# Patient Record
Sex: FEMALE | Race: BLACK OR AFRICAN AMERICAN | Employment: UNEMPLOYED | URBAN - METROPOLITAN AREA
[De-identification: names, ages, dates, MRNs, and addresses within clinical notes are randomized per-mention and may not be internally consistent; named-entity substitution may affect disease eponyms.]

---

## 2017-04-13 LAB — LDL-C, EXTERNAL: 104

## 2018-05-21 ENCOUNTER — HOSPITAL ENCOUNTER (OUTPATIENT)
Dept: MRI IMAGING | Age: 48
Discharge: HOME | End: 2018-05-21
Attending: ORTHOPAEDIC SURGERY
Payer: COMMERCIAL

## 2018-05-21 DIAGNOSIS — M54.42 ACUTE BACK PAIN WITH SCIATICA, LEFT: ICD-10-CM

## 2018-05-21 DIAGNOSIS — M54.41 ACUTE BACK PAIN WITH SCIATICA, RIGHT: ICD-10-CM

## 2018-05-21 PROCEDURE — 72148 MRI LUMBAR SPINE W/O DYE: CPT

## 2018-08-15 ENCOUNTER — HOSPITAL ENCOUNTER (OUTPATIENT)
Dept: CT IMAGING | Age: 48
Discharge: HOME | End: 2018-08-15
Attending: ORTHOPAEDIC SURGERY
Payer: COMMERCIAL

## 2018-08-15 ENCOUNTER — HOSPITAL ENCOUNTER (OUTPATIENT)
Dept: PREADMISSION TESTING | Age: 48
Discharge: HOME | End: 2018-08-15
Attending: ORTHOPAEDIC SURGERY
Payer: COMMERCIAL

## 2018-08-15 VITALS
HEART RATE: 73 BPM | WEIGHT: 293 LBS | SYSTOLIC BLOOD PRESSURE: 110 MMHG | DIASTOLIC BLOOD PRESSURE: 76 MMHG | HEIGHT: 64 IN | TEMPERATURE: 97.9 F | BODY MASS INDEX: 50.02 KG/M2

## 2018-08-15 DIAGNOSIS — M51.26 LUMBAR DISC HERNIATION: ICD-10-CM

## 2018-08-15 DIAGNOSIS — M48.061 LUMBAR STENOSIS: ICD-10-CM

## 2018-08-15 LAB
ABO + RH BLD: NORMAL
ANION GAP SERPL CALC-SCNC: 10 MMOL/L (ref 5–15)
APPEARANCE UR: CLEAR
ATRIAL RATE: 69 BPM
BACTERIA URNS QL MICRO: NEGATIVE /HPF
BILIRUB UR QL: NEGATIVE
BLOOD GROUP ANTIBODIES SERPL: NORMAL
BUN SERPL-MCNC: 18 MG/DL (ref 6–20)
BUN/CREAT SERPL: 23 (ref 12–20)
CALCIUM SERPL-MCNC: 9.4 MG/DL (ref 8.5–10.1)
CALCULATED P AXIS, ECG09: 63 DEGREES
CALCULATED R AXIS, ECG10: 18 DEGREES
CALCULATED T AXIS, ECG11: 23 DEGREES
CHLORIDE SERPL-SCNC: 106 MMOL/L (ref 97–108)
CO2 SERPL-SCNC: 22 MMOL/L (ref 21–32)
COLOR UR: NORMAL
CREAT SERPL-MCNC: 0.77 MG/DL (ref 0.55–1.02)
DIAGNOSIS, 93000: NORMAL
EPITH CASTS URNS QL MICRO: NORMAL /LPF
ERYTHROCYTE [DISTWIDTH] IN BLOOD BY AUTOMATED COUNT: 13.7 % (ref 11.5–14.5)
EST. AVERAGE GLUCOSE BLD GHB EST-MCNC: 108 MG/DL
GLUCOSE SERPL-MCNC: 98 MG/DL (ref 65–100)
GLUCOSE UR STRIP.AUTO-MCNC: NEGATIVE MG/DL
HBA1C MFR BLD: 5.4 % (ref 4.2–6.3)
HCT VFR BLD AUTO: 39.3 % (ref 35–47)
HGB BLD-MCNC: 12.4 G/DL (ref 11.5–16)
HGB UR QL STRIP: NEGATIVE
HYALINE CASTS URNS QL MICRO: NORMAL /LPF (ref 0–5)
INR PPP: 1 (ref 0.9–1.1)
KETONES UR QL STRIP.AUTO: NEGATIVE MG/DL
LEUKOCYTE ESTERASE UR QL STRIP.AUTO: NEGATIVE
MCH RBC QN AUTO: 30.3 PG (ref 26–34)
MCHC RBC AUTO-ENTMCNC: 31.6 G/DL (ref 30–36.5)
MCV RBC AUTO: 96.1 FL (ref 80–99)
NITRITE UR QL STRIP.AUTO: NEGATIVE
NRBC # BLD: 0 K/UL (ref 0–0.01)
NRBC BLD-RTO: 0 PER 100 WBC
P-R INTERVAL, ECG05: 128 MS
PH UR STRIP: 5 [PH] (ref 5–8)
PLATELET # BLD AUTO: 172 K/UL (ref 150–400)
PMV BLD AUTO: 11.5 FL (ref 8.9–12.9)
POTASSIUM SERPL-SCNC: 4.3 MMOL/L (ref 3.5–5.1)
PROT UR STRIP-MCNC: NEGATIVE MG/DL
PROTHROMBIN TIME: 10 SEC (ref 9–11.1)
Q-T INTERVAL, ECG07: 430 MS
QRS DURATION, ECG06: 88 MS
QTC CALCULATION (BEZET), ECG08: 460 MS
RBC # BLD AUTO: 4.09 M/UL (ref 3.8–5.2)
RBC #/AREA URNS HPF: NORMAL /HPF (ref 0–5)
SODIUM SERPL-SCNC: 138 MMOL/L (ref 136–145)
SP GR UR REFRACTOMETRY: 1.02 (ref 1–1.03)
SPECIMEN EXP DATE BLD: NORMAL
UA: UC IF INDICATED,UAUC: NORMAL
UROBILINOGEN UR QL STRIP.AUTO: 0.2 EU/DL (ref 0.2–1)
VENTRICULAR RATE, ECG03: 69 BPM
WBC # BLD AUTO: 6.5 K/UL (ref 3.6–11)
WBC URNS QL MICRO: NORMAL /HPF (ref 0–4)

## 2018-08-15 PROCEDURE — 72131 CT LUMBAR SPINE W/O DYE: CPT

## 2018-08-15 PROCEDURE — 83036 HEMOGLOBIN GLYCOSYLATED A1C: CPT | Performed by: ORTHOPAEDIC SURGERY

## 2018-08-15 PROCEDURE — 80048 BASIC METABOLIC PNL TOTAL CA: CPT | Performed by: ORTHOPAEDIC SURGERY

## 2018-08-15 PROCEDURE — 81001 URINALYSIS AUTO W/SCOPE: CPT | Performed by: ORTHOPAEDIC SURGERY

## 2018-08-15 PROCEDURE — 85610 PROTHROMBIN TIME: CPT | Performed by: ORTHOPAEDIC SURGERY

## 2018-08-15 PROCEDURE — 85027 COMPLETE CBC AUTOMATED: CPT | Performed by: ORTHOPAEDIC SURGERY

## 2018-08-15 PROCEDURE — 86900 BLOOD TYPING SEROLOGIC ABO: CPT | Performed by: ORTHOPAEDIC SURGERY

## 2018-08-15 PROCEDURE — 93005 ELECTROCARDIOGRAM TRACING: CPT

## 2018-08-15 RX ORDER — GUAIFENESIN 100 MG/5ML
81 LIQUID (ML) ORAL DAILY
COMMUNITY

## 2018-08-15 RX ORDER — MELOXICAM 15 MG/1
15 TABLET ORAL DAILY
COMMUNITY
End: 2018-09-12

## 2018-08-15 RX ORDER — ASCORBIC ACID 500 MG
TABLET ORAL DAILY
COMMUNITY

## 2018-08-15 RX ORDER — CYANOCOBALAMIN (VITAMIN B-12) 500 MCG
800 TABLET ORAL DAILY
COMMUNITY

## 2018-08-15 RX ORDER — TELMISARTAN AND HYDROCHLORTHIAZIDE 80; 12.5 MG/1; MG/1
1 TABLET ORAL DAILY
COMMUNITY
End: 2021-01-15

## 2018-08-15 RX ORDER — GABAPENTIN 300 MG/1
600 CAPSULE ORAL 2 TIMES DAILY
COMMUNITY

## 2018-08-15 NOTE — PERIOP NOTES
PREOPERATIVE INSTRUCTIONS REVIEWED WITH PATIENT. PATIENT GIVEN TWO--SIX PACKS OF CHG WIPES. INSTRUCTIONS REVIEWED ON USE OF CHG WIPES. PATIENT GIVEN SSI INFECTIONS SHEET; MRSA/MSSA TREATMENT INSTRUCTION SHEET GIVEN WITH AN EXPLANATION TO PATIENT THAT THEY WILL BE NOTIFIED IF TREATMENT INSTRUCTIONS NEED TO BE INITIATED. PATIENT WAS GIVEN THE OPPORTUNITY TO ASK QUESTIONS; REGARDING THE INFORMATION PROVIDED. PREOP DIET AND NUTRITION UPDATED GUIDELINES/ INSTRUCTIONS REVIEWED WITH PATIENT. PATIENT GIVEN INSTRUCTIONS REGARDING INCENTIVE SPIROMETRY USE IN PREOP JOINT CLASS. PATIENT ATTENDED PREOP JOINT CLASS TODAY.    DARIO/CATARINA NOTIFIED OF PATIENT WEIGHT:  324 LBS.

## 2018-08-16 LAB
BACTERIA SPEC CULT: NORMAL
BACTERIA SPEC CULT: NORMAL
SERVICE CMNT-IMP: NORMAL

## 2018-08-28 NOTE — PERIOP NOTES
RETURNED PATIENT PHONE CALL; SHE HAD CONCERNS RE: POST DISCHARGE CARE SINCE HER DTR. WILL BE WORKING DURING THE DAYTIME; INSTRUCTED HER THAT PHYSICAL THERAPY WILL EVALUATE HER NEEDS/ABILITIES POSTOP AND MAKE ARRANGEMENTS FOR HOME PT AND NURSING CARE AS NEEDED; ADVISED HER TO CONTACT SWETA SHAHID RN, SPINE SURGERY FOR ANY FURTHER INFORMATION.

## 2018-09-05 ENCOUNTER — ANESTHESIA EVENT (OUTPATIENT)
Dept: SURGERY | Age: 48
DRG: 460 | End: 2018-09-05
Payer: COMMERCIAL

## 2018-09-06 ENCOUNTER — APPOINTMENT (OUTPATIENT)
Dept: GENERAL RADIOLOGY | Age: 48
DRG: 460 | End: 2018-09-06
Attending: ORTHOPAEDIC SURGERY
Payer: COMMERCIAL

## 2018-09-06 ENCOUNTER — ANESTHESIA (OUTPATIENT)
Dept: SURGERY | Age: 48
DRG: 460 | End: 2018-09-06
Payer: COMMERCIAL

## 2018-09-06 ENCOUNTER — HOSPITAL ENCOUNTER (INPATIENT)
Age: 48
LOS: 6 days | Discharge: HOME HEALTH | DRG: 460 | End: 2018-09-12
Attending: ORTHOPAEDIC SURGERY | Admitting: ORTHOPAEDIC SURGERY
Payer: COMMERCIAL

## 2018-09-06 DIAGNOSIS — M48.061 SPINAL STENOSIS OF LUMBAR REGION, UNSPECIFIED WHETHER NEUROGENIC CLAUDICATION PRESENT: Primary | ICD-10-CM

## 2018-09-06 LAB
ABO + RH BLD: NORMAL
BLOOD GROUP ANTIBODIES SERPL: NORMAL
GLUCOSE BLD STRIP.AUTO-MCNC: 110 MG/DL (ref 65–100)
SERVICE CMNT-IMP: ABNORMAL
SPECIMEN EXP DATE BLD: NORMAL

## 2018-09-06 PROCEDURE — 77030013079 HC BLNKT BAIR HGGR 3M -A: Performed by: ANESTHESIOLOGY

## 2018-09-06 PROCEDURE — 77030032490 HC SLV COMPR SCD KNE COVD -B: Performed by: ORTHOPAEDIC SURGERY

## 2018-09-06 PROCEDURE — 77030029099 HC BN WAX SSPC -A: Performed by: ORTHOPAEDIC SURGERY

## 2018-09-06 PROCEDURE — 77030039456 HC KT SPINE DISP MAZR -G1: Performed by: ORTHOPAEDIC SURGERY

## 2018-09-06 PROCEDURE — 77030003029 HC SUT VCRL J&J -B: Performed by: ORTHOPAEDIC SURGERY

## 2018-09-06 PROCEDURE — 77030031139 HC SUT VCRL2 J&J -A: Performed by: ORTHOPAEDIC SURGERY

## 2018-09-06 PROCEDURE — 76210000017 HC OR PH I REC 1.5 TO 2 HR: Performed by: ORTHOPAEDIC SURGERY

## 2018-09-06 PROCEDURE — 77030011264 HC ELECTRD BLD EXT COVD -A: Performed by: ORTHOPAEDIC SURGERY

## 2018-09-06 PROCEDURE — 36415 COLL VENOUS BLD VENIPUNCTURE: CPT | Performed by: ORTHOPAEDIC SURGERY

## 2018-09-06 PROCEDURE — 77030034169 HC GRFT BN BIOACTV INTRFC 1G BSTEB -F: Performed by: ORTHOPAEDIC SURGERY

## 2018-09-06 PROCEDURE — 77030026438 HC STYL ET INTUB CARD -A: Performed by: ANESTHESIOLOGY

## 2018-09-06 PROCEDURE — 8E0W0CZ ROBOTIC ASSISTED PROCEDURE OF TRUNK REGION, OPEN APPROACH: ICD-10-PCS | Performed by: ORTHOPAEDIC SURGERY

## 2018-09-06 PROCEDURE — 77030004391 HC BUR FLUT MEDT -C: Performed by: ORTHOPAEDIC SURGERY

## 2018-09-06 PROCEDURE — 07DR3ZZ EXTRACTION OF ILIAC BONE MARROW, PERCUTANEOUS APPROACH: ICD-10-PCS | Performed by: ORTHOPAEDIC SURGERY

## 2018-09-06 PROCEDURE — 74011250637 HC RX REV CODE- 250/637: Performed by: ORTHOPAEDIC SURGERY

## 2018-09-06 PROCEDURE — 77030019908 HC STETH ESOPH SIMS -A: Performed by: ANESTHESIOLOGY

## 2018-09-06 PROCEDURE — 77030020782 HC GWN BAIR PAWS FLX 3M -B

## 2018-09-06 PROCEDURE — 77030008684 HC TU ET CUF COVD -B: Performed by: ANESTHESIOLOGY

## 2018-09-06 PROCEDURE — 77030012890

## 2018-09-06 PROCEDURE — C1713 ANCHOR/SCREW BN/BN,TIS/BN: HCPCS | Performed by: ORTHOPAEDIC SURGERY

## 2018-09-06 PROCEDURE — 74011000250 HC RX REV CODE- 250

## 2018-09-06 PROCEDURE — 77030039266 HC ADH SKN EXOFIN S2SG -A: Performed by: ORTHOPAEDIC SURGERY

## 2018-09-06 PROCEDURE — 88304 TISSUE EXAM BY PATHOLOGIST: CPT | Performed by: ORTHOPAEDIC SURGERY

## 2018-09-06 PROCEDURE — 0SG3071 FUSION OF LUMBOSACRAL JOINT WITH AUTOLOGOUS TISSUE SUBSTITUTE, POSTERIOR APPROACH, POSTERIOR COLUMN, OPEN APPROACH: ICD-10-PCS | Performed by: ORTHOPAEDIC SURGERY

## 2018-09-06 PROCEDURE — 74011250636 HC RX REV CODE- 250/636

## 2018-09-06 PROCEDURE — 77030014647 HC SEAL FBRN TISSL BAXT -D: Performed by: ORTHOPAEDIC SURGERY

## 2018-09-06 PROCEDURE — 77030035129: Performed by: ORTHOPAEDIC SURGERY

## 2018-09-06 PROCEDURE — 72100 X-RAY EXAM L-S SPINE 2/3 VWS: CPT

## 2018-09-06 PROCEDURE — 74011000250 HC RX REV CODE- 250: Performed by: ORTHOPAEDIC SURGERY

## 2018-09-06 PROCEDURE — 74011000272 HC RX REV CODE- 272: Performed by: ORTHOPAEDIC SURGERY

## 2018-09-06 PROCEDURE — 74011000258 HC RX REV CODE- 258

## 2018-09-06 PROCEDURE — 77030038020 HC MANFLD NEPTUNE STRY -B: Performed by: ORTHOPAEDIC SURGERY

## 2018-09-06 PROCEDURE — 77030020268 HC MISC GENERAL SUPPLY: Performed by: ORTHOPAEDIC SURGERY

## 2018-09-06 PROCEDURE — 74011250636 HC RX REV CODE- 250/636: Performed by: ORTHOPAEDIC SURGERY

## 2018-09-06 PROCEDURE — 77030034475 HC MISC IMPL SPN: Performed by: ORTHOPAEDIC SURGERY

## 2018-09-06 PROCEDURE — 77030018836 HC SOL IRR NACL ICUM -A: Performed by: ORTHOPAEDIC SURGERY

## 2018-09-06 PROCEDURE — 77030012406 HC DRN WND PENRS BARD -A: Performed by: ORTHOPAEDIC SURGERY

## 2018-09-06 PROCEDURE — 77030003666 HC NDL SPINAL BD -A: Performed by: ORTHOPAEDIC SURGERY

## 2018-09-06 PROCEDURE — 77030005515 HC CATH URETH FOL14 BARD -B: Performed by: ORTHOPAEDIC SURGERY

## 2018-09-06 PROCEDURE — 74011250636 HC RX REV CODE- 250/636: Performed by: ANESTHESIOLOGY

## 2018-09-06 PROCEDURE — 82962 GLUCOSE BLOOD TEST: CPT

## 2018-09-06 PROCEDURE — 76060000038 HC ANESTHESIA 3.5 TO 4 HR: Performed by: ORTHOPAEDIC SURGERY

## 2018-09-06 PROCEDURE — 65270000029 HC RM PRIVATE

## 2018-09-06 PROCEDURE — 76010000174 HC OR TIME 3.5 TO 4 HR INTENSV-TIER 1: Performed by: ORTHOPAEDIC SURGERY

## 2018-09-06 PROCEDURE — 86900 BLOOD TYPING SEROLOGIC ABO: CPT | Performed by: ORTHOPAEDIC SURGERY

## 2018-09-06 PROCEDURE — 76001 XR FLUOROSCOPY OVER 60 MINUTES: CPT

## 2018-09-06 DEVICE — SET SCREW, T30
Type: IMPLANTABLE DEVICE | Site: SPINE LUMBAR | Status: FUNCTIONAL
Brand: TAURUS

## 2018-09-06 DEVICE — SCREW, PEDICLE DL CANNULATED, Ø6.5X45MM
Type: IMPLANTABLE DEVICE | Site: SPINE LUMBAR | Status: FUNCTIONAL
Brand: TAURUS

## 2018-09-06 DEVICE — SCREW, PEDICLE DL CANNULATED, Ø7.5X45MM
Type: IMPLANTABLE DEVICE | Site: SPINE LUMBAR | Status: FUNCTIONAL
Brand: TAURUS

## 2018-09-06 DEVICE — GRAFT BNE SUB 7.5GM PTTY SYN SIGNAFUSE: Type: IMPLANTABLE DEVICE | Site: SPINE LUMBAR | Status: FUNCTIONAL

## 2018-09-06 DEVICE — 5.5MM CURVED ROD 45MM TI ALLOY
Type: IMPLANTABLE DEVICE | Site: SPINE LUMBAR | Status: FUNCTIONAL
Brand: TAURUS

## 2018-09-06 DEVICE — HEADBODY, ASSEMBLY, STANDARD
Type: IMPLANTABLE DEVICE | Site: SPINE LUMBAR | Status: FUNCTIONAL
Brand: TAURUS

## 2018-09-06 DEVICE — INTERFACE IS A SYNTHETIC BIOACTIVE BONE GRAFT FOR USE IN THE REPAIR OF OSSEOUS DEFECTS. IT IS SUPPLIED AS IRREGULAR SYNTHETIC GRANULES OF BIOACTIVE GLASS (45S5 BIOGLASS), SIZED FROM 200 MICRONS TO 420 MICRONS. WHEN IMPLANTED IN LIVING TISSUE, THE MATERIAL UNDERGOES A TIME DEPENDENT SURFACE MODIFICATION. THE SURFACE REACTION RESULTS IN THE FORMATION OF A CALCIUM PHOSPHATE LAYER, WHICH IS EQUIVALENT IN COMPOSITION AND STRUCTURE TO THE HYDROXYAPATITE FOUND IN BONE MINERAL. THE BIOLOGICAL APATITE LAYER OF THE GRANULES PROVIDES AN OSTEOCONDUCTIVE SCAFFOLD FOR THE GENERATION OF NEW OSSEOUS TISSUE. NEW BONE INFILTRATES AROUND THE GRANULES ALLOWING THE REPAIR OF THE DEFECT AS THE GRANULES ARE ABSORBED.
Type: IMPLANTABLE DEVICE | Site: SPINE LUMBAR | Status: FUNCTIONAL
Brand: INTERFACE

## 2018-09-06 RX ORDER — MORPHINE SULFATE 10 MG/ML
2 INJECTION, SOLUTION INTRAMUSCULAR; INTRAVENOUS
Status: DISCONTINUED | OUTPATIENT
Start: 2018-09-06 | End: 2018-09-06 | Stop reason: HOSPADM

## 2018-09-06 RX ORDER — BUPIVACAINE HYDROCHLORIDE AND EPINEPHRINE 5; 5 MG/ML; UG/ML
INJECTION, SOLUTION EPIDURAL; INTRACAUDAL; PERINEURAL AS NEEDED
Status: DISCONTINUED | OUTPATIENT
Start: 2018-09-06 | End: 2018-09-06 | Stop reason: HOSPADM

## 2018-09-06 RX ORDER — ONDANSETRON 2 MG/ML
INJECTION INTRAMUSCULAR; INTRAVENOUS AS NEEDED
Status: DISCONTINUED | OUTPATIENT
Start: 2018-09-06 | End: 2018-09-06 | Stop reason: HOSPADM

## 2018-09-06 RX ORDER — DIPHENHYDRAMINE HCL 12.5MG/5ML
12.5 ELIXIR ORAL
Status: DISPENSED | OUTPATIENT
Start: 2018-09-06 | End: 2018-09-07

## 2018-09-06 RX ORDER — KETAMINE HYDROCHLORIDE 10 MG/ML
INJECTION, SOLUTION INTRAMUSCULAR; INTRAVENOUS AS NEEDED
Status: DISCONTINUED | OUTPATIENT
Start: 2018-09-06 | End: 2018-09-06 | Stop reason: HOSPADM

## 2018-09-06 RX ORDER — ONDANSETRON 2 MG/ML
4 INJECTION INTRAMUSCULAR; INTRAVENOUS
Status: DISPENSED | OUTPATIENT
Start: 2018-09-06 | End: 2018-09-07

## 2018-09-06 RX ORDER — SODIUM CHLORIDE 0.9 % (FLUSH) 0.9 %
5-10 SYRINGE (ML) INJECTION EVERY 8 HOURS
Status: DISCONTINUED | OUTPATIENT
Start: 2018-09-06 | End: 2018-09-06 | Stop reason: HOSPADM

## 2018-09-06 RX ORDER — FENTANYL CITRATE 50 UG/ML
25 INJECTION, SOLUTION INTRAMUSCULAR; INTRAVENOUS
Status: COMPLETED | OUTPATIENT
Start: 2018-09-06 | End: 2018-09-06

## 2018-09-06 RX ORDER — ACETAMINOPHEN 325 MG/1
650 TABLET ORAL EVERY 6 HOURS
Status: DISCONTINUED | OUTPATIENT
Start: 2018-09-06 | End: 2018-09-12 | Stop reason: HOSPADM

## 2018-09-06 RX ORDER — FENTANYL CITRATE 50 UG/ML
50 INJECTION, SOLUTION INTRAMUSCULAR; INTRAVENOUS AS NEEDED
Status: DISCONTINUED | OUTPATIENT
Start: 2018-09-06 | End: 2018-09-06 | Stop reason: HOSPADM

## 2018-09-06 RX ORDER — SODIUM CHLORIDE 9 MG/ML
25 INJECTION, SOLUTION INTRAVENOUS CONTINUOUS
Status: DISCONTINUED | OUTPATIENT
Start: 2018-09-06 | End: 2018-09-06 | Stop reason: HOSPADM

## 2018-09-06 RX ORDER — FENTANYL CITRATE 50 UG/ML
INJECTION, SOLUTION INTRAMUSCULAR; INTRAVENOUS AS NEEDED
Status: DISCONTINUED | OUTPATIENT
Start: 2018-09-06 | End: 2018-09-06 | Stop reason: HOSPADM

## 2018-09-06 RX ORDER — HYDROMORPHONE HCL/0.9% NACL/PF 0.5 MG/ML
PLASTIC BAG, INJECTION (ML) INTRAVENOUS CONTINUOUS
Status: DISCONTINUED | OUTPATIENT
Start: 2018-09-06 | End: 2018-09-07

## 2018-09-06 RX ORDER — NALOXONE HYDROCHLORIDE 0.4 MG/ML
0.4 INJECTION, SOLUTION INTRAMUSCULAR; INTRAVENOUS; SUBCUTANEOUS AS NEEDED
Status: DISCONTINUED | OUTPATIENT
Start: 2018-09-06 | End: 2018-09-12 | Stop reason: HOSPADM

## 2018-09-06 RX ORDER — MIDAZOLAM HYDROCHLORIDE 1 MG/ML
1 INJECTION, SOLUTION INTRAMUSCULAR; INTRAVENOUS AS NEEDED
Status: DISCONTINUED | OUTPATIENT
Start: 2018-09-06 | End: 2018-09-06 | Stop reason: HOSPADM

## 2018-09-06 RX ORDER — ONDANSETRON 2 MG/ML
4 INJECTION INTRAMUSCULAR; INTRAVENOUS AS NEEDED
Status: DISCONTINUED | OUTPATIENT
Start: 2018-09-06 | End: 2018-09-06 | Stop reason: HOSPADM

## 2018-09-06 RX ORDER — MIDAZOLAM HYDROCHLORIDE 1 MG/ML
INJECTION, SOLUTION INTRAMUSCULAR; INTRAVENOUS AS NEEDED
Status: DISCONTINUED | OUTPATIENT
Start: 2018-09-06 | End: 2018-09-06 | Stop reason: HOSPADM

## 2018-09-06 RX ORDER — ASCORBIC ACID 500 MG
500 TABLET ORAL DAILY
Status: DISCONTINUED | OUTPATIENT
Start: 2018-09-07 | End: 2018-09-12 | Stop reason: HOSPADM

## 2018-09-06 RX ORDER — CYCLOBENZAPRINE HCL 10 MG
10 TABLET ORAL
Status: DISCONTINUED | OUTPATIENT
Start: 2018-09-06 | End: 2018-09-12 | Stop reason: HOSPADM

## 2018-09-06 RX ORDER — SODIUM CHLORIDE 0.9 % (FLUSH) 0.9 %
5-10 SYRINGE (ML) INJECTION AS NEEDED
Status: DISCONTINUED | OUTPATIENT
Start: 2018-09-06 | End: 2018-09-12 | Stop reason: HOSPADM

## 2018-09-06 RX ORDER — DEXAMETHASONE SODIUM PHOSPHATE 4 MG/ML
INJECTION, SOLUTION INTRA-ARTICULAR; INTRALESIONAL; INTRAMUSCULAR; INTRAVENOUS; SOFT TISSUE AS NEEDED
Status: DISCONTINUED | OUTPATIENT
Start: 2018-09-06 | End: 2018-09-06 | Stop reason: HOSPADM

## 2018-09-06 RX ORDER — LIDOCAINE HYDROCHLORIDE 10 MG/ML
0.1 INJECTION, SOLUTION EPIDURAL; INFILTRATION; INTRACAUDAL; PERINEURAL AS NEEDED
Status: DISCONTINUED | OUTPATIENT
Start: 2018-09-06 | End: 2018-09-06 | Stop reason: HOSPADM

## 2018-09-06 RX ORDER — FACIAL-BODY WIPES
10 EACH TOPICAL DAILY PRN
Status: DISCONTINUED | OUTPATIENT
Start: 2018-09-08 | End: 2018-09-12 | Stop reason: HOSPADM

## 2018-09-06 RX ORDER — SODIUM CHLORIDE 0.9 % (FLUSH) 0.9 %
5-10 SYRINGE (ML) INJECTION AS NEEDED
Status: DISCONTINUED | OUTPATIENT
Start: 2018-09-06 | End: 2018-09-06 | Stop reason: HOSPADM

## 2018-09-06 RX ORDER — LOSARTAN POTASSIUM 50 MG/1
100 TABLET ORAL DAILY
Status: DISCONTINUED | OUTPATIENT
Start: 2018-09-07 | End: 2018-09-09

## 2018-09-06 RX ORDER — GLYCOPYRROLATE 0.2 MG/ML
INJECTION INTRAMUSCULAR; INTRAVENOUS AS NEEDED
Status: DISCONTINUED | OUTPATIENT
Start: 2018-09-06 | End: 2018-09-06 | Stop reason: HOSPADM

## 2018-09-06 RX ORDER — POLYETHYLENE GLYCOL 3350 17 G/17G
17 POWDER, FOR SOLUTION ORAL DAILY
Status: DISCONTINUED | OUTPATIENT
Start: 2018-09-07 | End: 2018-09-07

## 2018-09-06 RX ORDER — SODIUM CHLORIDE 9 MG/ML
125 INJECTION, SOLUTION INTRAVENOUS CONTINUOUS
Status: DISPENSED | OUTPATIENT
Start: 2018-09-06 | End: 2018-09-07

## 2018-09-06 RX ORDER — DEXMEDETOMIDINE HYDROCHLORIDE 4 UG/ML
INJECTION, SOLUTION INTRAVENOUS AS NEEDED
Status: DISCONTINUED | OUTPATIENT
Start: 2018-09-06 | End: 2018-09-06 | Stop reason: HOSPADM

## 2018-09-06 RX ORDER — SODIUM CHLORIDE, SODIUM LACTATE, POTASSIUM CHLORIDE, CALCIUM CHLORIDE 600; 310; 30; 20 MG/100ML; MG/100ML; MG/100ML; MG/100ML
INJECTION, SOLUTION INTRAVENOUS
Status: DISCONTINUED | OUTPATIENT
Start: 2018-09-06 | End: 2018-09-06 | Stop reason: HOSPADM

## 2018-09-06 RX ORDER — SODIUM CHLORIDE, SODIUM LACTATE, POTASSIUM CHLORIDE, CALCIUM CHLORIDE 600; 310; 30; 20 MG/100ML; MG/100ML; MG/100ML; MG/100ML
125 INJECTION, SOLUTION INTRAVENOUS CONTINUOUS
Status: DISCONTINUED | OUTPATIENT
Start: 2018-09-06 | End: 2018-09-06 | Stop reason: HOSPADM

## 2018-09-06 RX ORDER — HYDROCHLOROTHIAZIDE 25 MG/1
12.5 TABLET ORAL DAILY
Status: DISCONTINUED | OUTPATIENT
Start: 2018-09-07 | End: 2018-09-09

## 2018-09-06 RX ORDER — OXYCODONE HYDROCHLORIDE 5 MG/1
10 TABLET ORAL
Status: DISCONTINUED | OUTPATIENT
Start: 2018-09-06 | End: 2018-09-12 | Stop reason: HOSPADM

## 2018-09-06 RX ORDER — SODIUM CHLORIDE 0.9 % (FLUSH) 0.9 %
5-10 SYRINGE (ML) INJECTION EVERY 8 HOURS
Status: DISCONTINUED | OUTPATIENT
Start: 2018-09-07 | End: 2018-09-12 | Stop reason: HOSPADM

## 2018-09-06 RX ORDER — DIPHENHYDRAMINE HYDROCHLORIDE 50 MG/ML
12.5 INJECTION, SOLUTION INTRAMUSCULAR; INTRAVENOUS AS NEEDED
Status: DISCONTINUED | OUTPATIENT
Start: 2018-09-06 | End: 2018-09-06 | Stop reason: HOSPADM

## 2018-09-06 RX ORDER — HYDROMORPHONE HYDROCHLORIDE 2 MG/ML
INJECTION, SOLUTION INTRAMUSCULAR; INTRAVENOUS; SUBCUTANEOUS AS NEEDED
Status: DISCONTINUED | OUTPATIENT
Start: 2018-09-06 | End: 2018-09-06 | Stop reason: HOSPADM

## 2018-09-06 RX ORDER — GABAPENTIN 300 MG/1
600 CAPSULE ORAL 2 TIMES DAILY
Status: DISCONTINUED | OUTPATIENT
Start: 2018-09-06 | End: 2018-09-12

## 2018-09-06 RX ORDER — OXYCODONE AND ACETAMINOPHEN 5; 325 MG/1; MG/1
1 TABLET ORAL AS NEEDED
Status: DISCONTINUED | OUTPATIENT
Start: 2018-09-06 | End: 2018-09-06 | Stop reason: HOSPADM

## 2018-09-06 RX ORDER — SUCCINYLCHOLINE CHLORIDE 20 MG/ML
INJECTION INTRAMUSCULAR; INTRAVENOUS AS NEEDED
Status: DISCONTINUED | OUTPATIENT
Start: 2018-09-06 | End: 2018-09-06 | Stop reason: HOSPADM

## 2018-09-06 RX ORDER — ROCURONIUM BROMIDE 10 MG/ML
INJECTION, SOLUTION INTRAVENOUS AS NEEDED
Status: DISCONTINUED | OUTPATIENT
Start: 2018-09-06 | End: 2018-09-06 | Stop reason: HOSPADM

## 2018-09-06 RX ORDER — LIDOCAINE HYDROCHLORIDE 20 MG/ML
INJECTION, SOLUTION EPIDURAL; INFILTRATION; INTRACAUDAL; PERINEURAL AS NEEDED
Status: DISCONTINUED | OUTPATIENT
Start: 2018-09-06 | End: 2018-09-06 | Stop reason: HOSPADM

## 2018-09-06 RX ORDER — OXYCODONE HYDROCHLORIDE 5 MG/1
5 TABLET ORAL
Status: DISCONTINUED | OUTPATIENT
Start: 2018-09-06 | End: 2018-09-12 | Stop reason: HOSPADM

## 2018-09-06 RX ORDER — PROPOFOL 10 MG/ML
INJECTION, EMULSION INTRAVENOUS AS NEEDED
Status: DISCONTINUED | OUTPATIENT
Start: 2018-09-06 | End: 2018-09-06 | Stop reason: HOSPADM

## 2018-09-06 RX ORDER — AMOXICILLIN 250 MG
1 CAPSULE ORAL 2 TIMES DAILY
Status: DISCONTINUED | OUTPATIENT
Start: 2018-09-06 | End: 2018-09-12 | Stop reason: HOSPADM

## 2018-09-06 RX ORDER — VANCOMYCIN HYDROCHLORIDE 1 G/20ML
INJECTION, POWDER, LYOPHILIZED, FOR SOLUTION INTRAVENOUS AS NEEDED
Status: DISCONTINUED | OUTPATIENT
Start: 2018-09-06 | End: 2018-09-06 | Stop reason: HOSPADM

## 2018-09-06 RX ORDER — ACETAMINOPHEN 10 MG/ML
INJECTION, SOLUTION INTRAVENOUS AS NEEDED
Status: DISCONTINUED | OUTPATIENT
Start: 2018-09-06 | End: 2018-09-06 | Stop reason: HOSPADM

## 2018-09-06 RX ORDER — MIDAZOLAM HYDROCHLORIDE 1 MG/ML
0.5 INJECTION, SOLUTION INTRAMUSCULAR; INTRAVENOUS
Status: DISCONTINUED | OUTPATIENT
Start: 2018-09-06 | End: 2018-09-06 | Stop reason: HOSPADM

## 2018-09-06 RX ADMIN — FENTANYL CITRATE 25 MCG: 50 INJECTION, SOLUTION INTRAMUSCULAR; INTRAVENOUS at 12:35

## 2018-09-06 RX ADMIN — PROPOFOL 200 MG: 10 INJECTION, EMULSION INTRAVENOUS at 08:07

## 2018-09-06 RX ADMIN — SUCCINYLCHOLINE CHLORIDE 160 MG: 20 INJECTION INTRAMUSCULAR; INTRAVENOUS at 08:07

## 2018-09-06 RX ADMIN — SODIUM CHLORIDE 125 ML/HR: 900 INJECTION, SOLUTION INTRAVENOUS at 12:00

## 2018-09-06 RX ADMIN — FENTANYL CITRATE 25 MCG: 50 INJECTION, SOLUTION INTRAMUSCULAR; INTRAVENOUS at 12:30

## 2018-09-06 RX ADMIN — SODIUM CHLORIDE, SODIUM LACTATE, POTASSIUM CHLORIDE, CALCIUM CHLORIDE: 600; 310; 30; 20 INJECTION, SOLUTION INTRAVENOUS at 07:58

## 2018-09-06 RX ADMIN — DEXMEDETOMIDINE HYDROCHLORIDE 8 MCG: 4 INJECTION, SOLUTION INTRAVENOUS at 08:26

## 2018-09-06 RX ADMIN — CEFAZOLIN 3 G: 1 INJECTION, POWDER, FOR SOLUTION INTRAMUSCULAR; INTRAVENOUS; PARENTERAL at 17:51

## 2018-09-06 RX ADMIN — HYDROMORPHONE HYDROCHLORIDE 0.6 MG: 2 INJECTION, SOLUTION INTRAMUSCULAR; INTRAVENOUS; SUBCUTANEOUS at 11:42

## 2018-09-06 RX ADMIN — FENTANYL CITRATE 100 MCG: 50 INJECTION, SOLUTION INTRAMUSCULAR; INTRAVENOUS at 08:07

## 2018-09-06 RX ADMIN — HYDROMORPHONE HYDROCHLORIDE 0.6 MG: 2 INJECTION, SOLUTION INTRAMUSCULAR; INTRAVENOUS; SUBCUTANEOUS at 11:21

## 2018-09-06 RX ADMIN — DEXAMETHASONE SODIUM PHOSPHATE 4 MG: 4 INJECTION, SOLUTION INTRA-ARTICULAR; INTRALESIONAL; INTRAMUSCULAR; INTRAVENOUS; SOFT TISSUE at 08:18

## 2018-09-06 RX ADMIN — FENTANYL CITRATE 50 MCG: 50 INJECTION, SOLUTION INTRAMUSCULAR; INTRAVENOUS at 10:01

## 2018-09-06 RX ADMIN — Medication: at 12:00

## 2018-09-06 RX ADMIN — FENTANYL CITRATE 25 MCG: 50 INJECTION, SOLUTION INTRAMUSCULAR; INTRAVENOUS at 13:30

## 2018-09-06 RX ADMIN — FENTANYL CITRATE 25 MCG: 50 INJECTION, SOLUTION INTRAMUSCULAR; INTRAVENOUS at 12:25

## 2018-09-06 RX ADMIN — ACETAMINOPHEN 1000 MG: 10 INJECTION, SOLUTION INTRAVENOUS at 08:28

## 2018-09-06 RX ADMIN — CEFAZOLIN 3 G: 1 INJECTION, POWDER, FOR SOLUTION INTRAMUSCULAR; INTRAVENOUS; PARENTERAL at 08:28

## 2018-09-06 RX ADMIN — SODIUM CHLORIDE, SODIUM LACTATE, POTASSIUM CHLORIDE, AND CALCIUM CHLORIDE 125 ML/HR: 600; 310; 30; 20 INJECTION, SOLUTION INTRAVENOUS at 07:09

## 2018-09-06 RX ADMIN — DIPHENHYDRAMINE HYDROCHLORIDE 12.5 MG: 12.5 SOLUTION ORAL at 20:44

## 2018-09-06 RX ADMIN — ACETAMINOPHEN 650 MG: 325 TABLET, FILM COATED ORAL at 17:44

## 2018-09-06 RX ADMIN — MIDAZOLAM HYDROCHLORIDE 2 MG: 1 INJECTION, SOLUTION INTRAMUSCULAR; INTRAVENOUS at 07:58

## 2018-09-06 RX ADMIN — GLYCOPYRROLATE 0.2 MG: 0.2 INJECTION INTRAMUSCULAR; INTRAVENOUS at 09:36

## 2018-09-06 RX ADMIN — SODIUM CHLORIDE 125 ML/HR: 900 INJECTION, SOLUTION INTRAVENOUS at 23:36

## 2018-09-06 RX ADMIN — KETAMINE HYDROCHLORIDE 50 MG: 10 INJECTION, SOLUTION INTRAMUSCULAR; INTRAVENOUS at 08:17

## 2018-09-06 RX ADMIN — GABAPENTIN 600 MG: 300 CAPSULE ORAL at 17:45

## 2018-09-06 RX ADMIN — DEXMEDETOMIDINE HYDROCHLORIDE 8 MCG: 4 INJECTION, SOLUTION INTRAVENOUS at 08:20

## 2018-09-06 RX ADMIN — ONDANSETRON 4 MG: 2 INJECTION INTRAMUSCULAR; INTRAVENOUS at 11:14

## 2018-09-06 RX ADMIN — MEPERIDINE HYDROCHLORIDE 25 MG: 50 INJECTION INTRAMUSCULAR; INTRAVENOUS; SUBCUTANEOUS at 14:15

## 2018-09-06 RX ADMIN — DEXMEDETOMIDINE HYDROCHLORIDE 8 MCG: 4 INJECTION, SOLUTION INTRAVENOUS at 08:18

## 2018-09-06 RX ADMIN — LIDOCAINE HYDROCHLORIDE 80 MG: 20 INJECTION, SOLUTION EPIDURAL; INFILTRATION; INTRACAUDAL; PERINEURAL at 08:07

## 2018-09-06 RX ADMIN — HYDROMORPHONE HYDROCHLORIDE 0.4 MG: 2 INJECTION, SOLUTION INTRAMUSCULAR; INTRAVENOUS; SUBCUTANEOUS at 11:25

## 2018-09-06 RX ADMIN — PROPOFOL 30 MG: 10 INJECTION, EMULSION INTRAVENOUS at 11:30

## 2018-09-06 RX ADMIN — PROPOFOL 20 MG: 10 INJECTION, EMULSION INTRAVENOUS at 11:28

## 2018-09-06 RX ADMIN — MIDAZOLAM 0.5 MG: 1 INJECTION INTRAMUSCULAR; INTRAVENOUS at 14:15

## 2018-09-06 RX ADMIN — DEXMEDETOMIDINE HYDROCHLORIDE 8 MCG: 4 INJECTION, SOLUTION INTRAVENOUS at 08:16

## 2018-09-06 RX ADMIN — ROCURONIUM BROMIDE 5 MG: 10 INJECTION, SOLUTION INTRAVENOUS at 08:07

## 2018-09-06 RX ADMIN — SENNOSIDES AND DOCUSATE SODIUM 1 TABLET: 8.6; 5 TABLET ORAL at 17:44

## 2018-09-06 RX ADMIN — ONDANSETRON 4 MG: 2 INJECTION INTRAMUSCULAR; INTRAVENOUS at 18:28

## 2018-09-06 RX ADMIN — DEXMEDETOMIDINE HYDROCHLORIDE 8 MCG: 4 INJECTION, SOLUTION INTRAVENOUS at 08:34

## 2018-09-06 NOTE — OP NOTES
[address] [place] [address] REPORT    Navjot Hayes  MR#: [id number]  : 1970  ACCOUNT #: [de-identified]   DATE OF SERVICE: 2018    PREOPERATIVE DIAGNOSES:  Spinal stenosis, facet cyst.    POSTOPERATIVE DIAGNOSES:  Spinal stenosis, facet cyst.    PROCEDURES PERFORMED:  1. Laminectomy removal of facet cyst at L5-S1 with medial facetectomies and foraminotomies. 2.  Posterolateral fusion L5-S1.  3.  Placement of pedicle screws and nonsegmental instrumentation at L5-S1.  4.  Use of [employer] to place pedicle screws. 5.  Posterolateral fusion at L5-S1.  6.  Bone marrow aspirate, right hip and use of allograft bone and bone graft substitute. ESTIMATED BLOOD LOSS:  Approximately 200 mL. COMPLICATIONS:  None. SURGEON:  Noemy Carpenter MD    ASSISTANT:  Dino Ribeiro NP    SPECIMENS REMOVED:  Facet cyst.    ANESTHESIA:  General.    IMPLANTS:  [employer] pedicle screws 6.5 mm at L5 and 7.5 mm of S1.    INDICATIONS FOR PROCEDURE:  The patient is a pleasant female with a large facet cyst at the transforaminal with severe foraminal stenosis and facet incompetence. After discussing risks and benefits of surgery, she elected to proceed with decompression and fusion of the level. She understood the risk including nerve damage, infection, perioperative morbidity and mortality, continued pain, being no better, being worse, bleeding, infection, adjacent segment disease, nonunion and all the risks inherent to this procedure including paralysis and she elected to go forward with the procedure. The patient was laid prone on the operative table, prepped and draped in normal fashion using alcohol and DuraPrep. Skin incision was made. Soft tissue dissected down to the spinous processes at L5-S1. Soft tissue dissected over the facet joints and then exposing the L5 transverse processes and the sacral ala. Pedicle screws were placed using the [employer] robot[employer] system.   All screws were quiet to 17 milliamps of EMG stimulation. All screws had good purchase, sizes were as above. After placement of the pedicle screws, the wounds were copiously irrigated. Laminectomy was done at L5-S1. Medial facetectomies and foraminotomies were done on the right side and the left side lateral recess was decompressed. After decompression, a large facet cyst was seen in the facet joint and exiting out transforaminally. This was removed as the facet was removed and the nerve root was completely free. The wounds were irrigated again. The bones were decorticated including the facet joints ala and L5 transverse process. Aspirate was taken from the left hip, mixed with allograft bone and [employer] bone graft substitute and placed in the facets and out posterolaterally. After this, rods and caps were torqued on audible click. A gram of vancomycin was placed in the wound for infection prophylaxis after irrigation with Betadine and bacitracin solution. A deep drain was placed. The fascia was closed with #1 Vicryl. Skin was closed with 2-0 Vicryl, 3-0 Vicryl and Dermabond. There were no complications to the procedure.       I, Dr. Shelia Zavala, did the procedure myself with the help of Gali Canales NP.      MD PIERRE Pagan / [place]  D: 09/06/2018 11:42     T: 09/06/2018 13:37  JOB #: [id number]

## 2018-09-06 NOTE — BRIEF OP NOTE
BRIEF OPERATIVE NOTE Date of Procedure: 9/6/2018 Preoperative Diagnosis: LUMBAR STENOSIS, LUMBAR DISC HERNIATION Postoperative Diagnosis: LUMBAR STENOSIS, LUMBAR DISC HERNIATION Procedure(s): 
L5-S1 RIGHT FACET CYST REMOVAL,  L5- S1 FUSION WITH MAZOR (REQ MAZOR X) Surgeon(s) and Role: Vinayak Díaz MD - Primary Surgical Assistant: Michelle wesley Surgical Staff: 
Circ-1: Tiffany Paul 
Circ-2: Jero Patricia RN 
Circ-Relief: Terrell Horn RN; Yolie Graves RN Radiology Technician: Vernon Gilford, RT, R Scrub Tech-1: Marylee Handler Scrub RN-1: Jerilyn Merino RN 
Nurse Practitioner: Nathalie Sharma NP Event Time In Incision Start 2354 Incision Close 1128 Anesthesia: General  
Estimated Blood Loss: 200cc Specimens:  
ID Type Source Tests Collected by Time Destination 1 : RIGHT LUMBAR FACET CYST Fresh Other                  Daron Younger MD 9/6/2018 1029 Pathology Findings: facet cyst 
Complications: none Implants:  
Implant Name Type Inv. Item Serial No.  Lot No. LRB No. Used Action DEMINERALIZED BONE FIBERS   [id number] [employer] [id number] N/A 1 Implanted DEMINERALIZED BONE FIBERS   [id number] [employer] [id number] N/A 1 Implanted GRAFT BNE BIOACTV INTERFC 1GM -- INTERFACE - SN/A  GRAFT BNE BIOACTV INTERFC 1GM -- INTERFACE N/A [id number] [facility] [id number] N/A 1 Implanted GRAFT BNE PUTTY BIOACTV 7.5GM -- SIGNAFUSE - SN/A  GRAFT BNE PUTTY BIOACTV 7.5GM -- SIGNAFUSE N/A [id number] [facility] [id number] N/A 1 Implanted SCR SET SPNE T30 -- BING - SN/A  SCR SET SPNE T30 -- BING N/A [employer] [facility] N/A N/A 4 Implanted ASSEMBLY HEADBODY STD -- BING - SN/A  ASSEMBLY HEADBODY STD -- BING N/A [employer] [facility] N/A N/A 4 Implanted SCR PEDCL 2LD LUPE 7.5X45MM -- BING - SN/A  SCR PEDCL 2LD LUPE 7.5X45MM -- BING N/A [employer] [facility] N/A N/A 2 Implanted SCR PEDCL 2LD LUPE 6.5X45MM -- BING - SN/A  SCR PEDCL 2LD LUPE 6.5X45MM -- BING N/A [employer] [facility] N/A N/A 2 Implanted JEYSON SPNE CRV 5.5X45MM --  - SN/A   [employer]ON SPNE CRV 5.5X45MM --  N/A [employer] [facility] N/A N/A 2 Implanted

## 2018-09-06 NOTE — ROUTINE PROCESS
Patient: Ryan Villarreal MRN: [id number]  SSN: [id number] YOB: 1970  Age: 46 y.o. Sex: female Patient is status post Procedure(s): 
L5-S1 RIGHT FACET CYST REMOVAL, POSSIBLE L5- S1 FUSION WITH MAZOR (REQ MAZOR X). Surgeon(s) and Role: 
    Raysa Abbasi MD - Primary Local/Dose/Irrigation:  SEE MAR Peripheral IV 09/06/18 Left Hand (Active) Site Assessment Clean, dry, & intact 9/6/2018  7:03 AM  
Phlebitis Assessment 0 9/6/2018  7:03 AM  
Infiltration Assessment 0 9/6/2018  7:03 AM  
Dressing Status Clean, dry, & intact; New 9/6/2018  7:03 AM  
Dressing Type Transparent;Tape 9/6/2018  7:03 AM  
Hub Color/Line Status Pink 9/6/2018  7:03 AM  
      
Hemovac Right;Posterior Back (Active) Airway - Endotracheal Tube 09/06/18 Oral (Active) Dressing/Packing:  Wound Back Posterior-DRESSING TYPE: 4 x 4;Topical skin adhesive/glue (09/06/18 0900) Splint/Cast:  ] Other:

## 2018-09-06 NOTE — ROUTINE PROCESS
Patient: Odell Madison MRN: [id number]  SSN: [id number] YOB: 1970  Age: 46 y.o. Sex: female Patient is status post Procedure(s): 
L5-S1 RIGHT FACET CYST REMOVAL,  L5- S1 FUSION WITH MAZOR (REQ MAZOR X). Surgeon(s) and Role: 
    Gigi Sanabria MD - Primary Local/Dose/Irrigation: see MAR Peripheral IV 09/06/18 Left Hand (Active) Site Assessment Clean, dry, & intact 9/6/2018  7:03 AM  
Phlebitis Assessment 0 9/6/2018  7:03 AM  
Infiltration Assessment 0 9/6/2018  7:03 AM  
Dressing Status Clean, dry, & intact; New 9/6/2018  7:03 AM  
Dressing Type Transparent;Tape 9/6/2018  7:03 AM  
Hub Color/Line Status Pink 9/6/2018  7:03 AM  
      
Hemovac Right;Posterior Back (Active) Airway - Endotracheal Tube 09/06/18 Oral (Active) Dressing/Packing:  Wound Back Posterior-DRESSING TYPE: 4 x 4;Topical skin adhesive/glue (09/06/18 0900) Splint/Cast:  ] Other:

## 2018-09-06 NOTE — ANESTHESIA POSTPROCEDURE EVALUATION
Post-Anesthesia Evaluation and Assessment Patient: Zain Fernandez MRN: [id number]  SSN: [id number] YOB: 1970  Age: 46 y.o. Sex: female Cardiovascular Function/Vital Signs Visit Vitals  BP 96/45  Pulse 63  Temp 37.1 °C (98.8 °F)  Resp 19  
 Ht 5' 4\" (1.626 m)  Wt 147 kg (324 lb)  SpO2 98%  BMI 55.61 kg/m2 Patient is status post general anesthesia for Procedure(s): 
L5-S1 RIGHT FACET CYST REMOVAL,  L5- S1 FUSION WITH MAZOR (REQ MAZOR X). Nausea/Vomiting: None Postoperative hydration reviewed and adequate. Pain: 
Pain Scale 1: Numeric (0 - 10) (09/06/18 1235) Pain Intensity 1: 6 (09/06/18 1235) Managed Neurological Status:  
Neuro (WDL): Exceptions to WDL (09/06/18 0631) Neuro Neurologic State: Alert (09/06/18 0631) Orientation Level: Oriented X4 (09/06/18 0631) Cognition: Appropriate decision making; Appropriate for age attention/concentration; Appropriate safety awareness; Follows commands (09/06/18 0631) Speech: Clear (09/06/18 0631) RLE Motor Response: Tingling;Numbness (09/06/18 0631) At baseline Mental Status and Level of Consciousness: Arousable Pulmonary Status:  
O2 Device: CO2 nasal cannula (09/06/18 1145) Adequate oxygenation and airway patent Complications related to anesthesia: None Post-anesthesia assessment completed. No concerns Signed By: Genoveva Kellogg MD   
 September 6, 2018

## 2018-09-06 NOTE — ANESTHESIA PREPROCEDURE EVALUATION
Anesthetic History No history of anesthetic complications Review of Systems / Medical History Patient summary reviewed, nursing notes reviewed and pertinent labs reviewed Pulmonary Within defined limits Neuro/Psych  
 
 
 
TIA Cardiovascular Hypertension: well controlled Exercise tolerance: >4 METS 
  
GI/Hepatic/Renal 
Within defined limits Endo/Other Obesity and arthritis Other Findings Physical Exam 
 
Airway Mallampati: II 
TM Distance: > 6 cm Neck ROM: normal range of motion Mouth opening: Normal 
 
 Cardiovascular Regular rate and rhythm,  S1 and S2 normal,  no murmur, click, rub, or gallop Dental 
No notable dental hx Pulmonary Breath sounds clear to auscultation Abdominal 
GI exam deferred Other Findings Anesthetic Plan ASA: 3 Anesthesia type: general 
 
 
 
 
Induction: Intravenous Anesthetic plan and risks discussed with: Patient

## 2018-09-06 NOTE — PERIOP NOTES
TRANSFER - OUT REPORT: 
 
Verbal report given to [facility] on Michaelle Lopez  being transferred to [place] [id number] for routine progression of care Report consisted of patients Situation, Background, Assessment and  
Recommendations(SBAR). Time Pre op antibiotic given:0828 Anesthesia Stop time: 1146 Rodney Present on Transfer to floor:y Order for Rodney on Chart:y 
Discharge Prescriptions with Chart:n Information from the following report(s) SBAR, Kardex, OR Summary, Procedure Summary, Intake/Output, MAR, Recent Results and Med Rec Status was reviewed with the receiving nurse. Opportunity for questions and clarification was provided. Is the patient on 02? YES 
     L/Min 2 Other Is the patient on a monitor? NO Is the nurse transporting with the patient? NO Surgical Waiting Area notified of patient's transfer from PACU? YES The following personal items collected during your admission accompanied patient upon transfer:  
Dental Appliance: Dental Appliances: None Vision: Visual Aid: None Hearing Aid:   
Jewelry: Jewelry: None Clothing: Clothing: Other (comment) (CLOTHING & SHOES TO PACU) Other Valuables: Other Valuables: None Valuables sent to safe:

## 2018-09-06 NOTE — IP AVS SNAPSHOT
[address] 
[phone] Patient: Yfn Rodas MRN: [id number] :1970 About your hospitalization You were admitted on:  2018 You last received care in the:  [facility] 6S NEURO-SCI TELE You were discharged on:  2018 Why you were hospitalized Your primary diagnosis was:  Not on File Your diagnoses also included:  Spinal Stenosis Of Lumbar Region, Morbid Obesity (Hcc) Follow-up Information Follow up With Details Comments Contact Info In 1 day Home Health PT/OT. Please call [phone] if you do not hear from [facility] by 11AM the day after discharge. [facility] Address:  [address] Phone: [phone] Mahad Pfeiffer MD   [address] [phone] Shruti Neff MD Schedule an appointment as soon as possible for a visit in 2 weeks follow-up for SVT after surgery [facility] [address] [facility] 57 
[phone] Discharge Orders None A check ezra indicates which time of day the medication should be taken. My Medications START taking these medications Instructions Each Dose to Equal  
 Morning Noon Evening Bedtime  
 ferrous sulfate 325 mg (65 mg iron) tablet Commonly known as:  Iron (Ferrous Sulfate) Your last dose was: Your next dose is: Take 1 Tab by mouth Daily (before breakfast). 325 mg  
    
   
   
   
  
 methocarbamol 750 mg tablet Commonly known as:  ROBAXIN Your last dose was: Your next dose is: Take 1 Tab by mouth three (3) times daily. 750 mg  
    
   
   
   
  
 metoprolol tartrate 25 mg tablet Commonly known as:  LOPRESSOR Your last dose was: Your next dose is: Take 1 Tab by mouth every twelve (12) hours. 25 mg  
    
   
   
   
  
 oxyCODONE IR 5 mg immediate release tablet Commonly known as:  Charolet Music Your last dose was: Your next dose is: Take 1-2 Tabs by mouth every four (4) hours as needed. Max Daily Amount: 60 mg.  
 5-10 mg  
    
   
   
   
  
 senna-docusate 8.6-50 mg per tablet Commonly known as:  Gaby Mt Your last dose was: Your next dose is: Take 1 Tab by mouth two (2) times a day. Indications: constipation, hold for loose stool or diarrhea 1 Tab CONTINUE taking these medications Instructions Each Dose to Equal  
 Morning Noon Evening Bedtime  
 aspirin 81 mg chewable tablet Your last dose was: Your next dose is: Take 81 mg by mouth daily. 81 mg  
    
   
   
   
  
 cholecalciferol 400 unit Tab tablet Commonly known as:  VITAMIN D3 Your last dose was: Your next dose is: Take 800 Units by mouth daily. 800 Units  
    
   
   
   
  
 gabapentin 300 mg capsule Commonly known as:  NEURONTIN Your last dose was: Your next dose is: Take 600 mg by mouth two (2) times a day. 600 mg  
    
   
   
   
  
 telmisartan-hydroCHLOROthiazide 80-12.5 mg per tablet Commonly known as:  MICARDIS HCT Your last dose was: Your next dose is: Take 1 Tab by mouth daily. 1 Tab VITAMIN C 500 mg tablet Generic drug:  ascorbic acid (vitamin C) Your last dose was: Your next dose is: Take  by mouth daily. STOP taking these medications   
 meloxicam 15 mg tablet Commonly known as:  MOBIC Where to Get Your Medications Information on where to get these meds will be given to you by the nurse or doctor. ! Ask your nurse or doctor about these medications ferrous sulfate 325 mg (65 mg iron) tablet  
 methocarbamol 750 mg tablet  
 metoprolol tartrate 25 mg tablet  
 oxyCODONE IR 5 mg immediate release tablet  
 senna-docusate 8.6-50 mg per tablet Opioid Education Prescription Opioids: What You Need to Know: 
 
 
Your blood pressure has been low postoperatively and should stop your Micardis HCT until you see your primary care doctor. You were found to have an arrhythmia postoperatively called SVT. You should continue metoprolol twice daily to control your heart rate. This may cause dizziness or low blood pressure. Please check your blood pressure daily at home and call your doctor for low blood pressure or symptoms of lightheadedness, dizziness, or fainting. Follow up appointments 
-follow up with Dr. Karen Landa in 2 weeks. Call [phone] to make an appointment as soon as you get home from the hospital. 
 
When to call your Orthopaedic Surgeon: 
-Signs of infection-if your incision is red; continues to have drainage; drainage has a foul odor or if you have a persistent fever over 101 degrees for 24 hours 
-Nausea or vomiting, severe headache 
-Loss of bowel or bladder function, inability to urinate 
-Changes in sensation in your arms or legs (numbness, tingling, loss of color) -Increased weakness-greater than before your surgery 
-Severe pain or pain not relieved by medications 
-Signs of a blood clot in your leg-calf pain, tenderness, redness, swelling of lower leg When to call your Primary Care Physician: 
-Concerns about medical conditions such as diabetes, high blood pressure, asthma, congestive heart failure 
-Call if blood sugars are elevated, persistent headache or dizziness, coughing or congestion, constipation or diarrhea, burning with urination, abnormal heart rate When to call 911 and go to the nearest emergency room: 
-Acute onset of chest pain, shortness of breath, difficulty breathing Activity 
-You are going home a well person, be as active as possible. Your only exercise should be walking. Start with short frequent walks and increase your walking distance each day. 
-Limit the amount of time you sit to 20-30 minute intervals. Sitting for prolonged periods of time will be uncomfortable for you following surgery. 
-Do NOT lift anything over 5 pounds 
-Do NOT do any straining, twisting or bending 
-When you are in bed, you may lay on your back or on either side. Do NOT lie on your stomach Brace 
-If you have a back brace, you should wear your brace at all times when you are out of bed. Do not wear the brace while in bed or showering. 
-Remember to always wear a cotton t-shirt underneath your brace. 
-Do not bend or twist when your brace is off Diet 
-Resume usual diet; drink plenty of fluids; eat foods high in fiber 
-It is important to have regular bowel movements. Pain medications may cause constipation. You may want to take a stool softener (such as Senokot-S or Colace) to prevent constipation. 
 -If constipation occurs, take a laxative (such as Dulcolax tablets, Milk of Magnesia, or a suppository). Laxatives should only be used if the above preventable measures have failed and you still have not had a bowel movement after three days Driving 
-You may not drive or return to work until instructed by your physician. However, you may ride in the car for short periods of time. Incision Care -You may take brief showers but do not run the water run directly onto the wound. After showering or bathing, remove the wet dressing and gently blot the wound dry with a soft towel. 
-Do not rub or apply any lotions or ointments to your incision site.  
-Do not soak or scrub your wound 
-Keep a dry dressing (ABD and paper tape) on your incision and have it changed daily for 14 days after surgery; more often if your incision is draining. Have your caregiver wash their hands thoroughly before changing your dressing. 
-You will have absorbable sutures and steristrips (white tape) on your incision. Leave the steristrips on until they fall off. Showering 
-You may shower in approximately 4 days after your surgery.   
-Leave the dressing on during your shower. Do NOT allow the water to run directly onto your dressing. Once you get out of the shower, put on a dry dressing. 
-Reminder- your brace can be removed while showering. Remember to not bend or twist while your brace is off.   
-Do not take a tub bath. Preventing blood clots 
-You have been given T.E.D. stockings to wear. Continue to wear these for 7 days after your discharge. Put them on in the morning and take them off at night.   
-They are used to increase your circulation and prevent blood clots from forming in your legs 
-T. E.D. stockings can be machine washed, temperature not to exceed 160° F (71°C) and machine dried for 15 to 20 minutes, temperature not to exceed 250° F (121°C). Pain management 
-Take pain medication as prescribed; decrease the amount you use as your pain lessens 
-DO not wait until you are in extreme pain to take your medication. 
-Avoid alcoholic beverages while taking pain medication Pain Medication Safety DO: 
-Read the Medication Guide  
-Take your medicine exactly as prescribed  
-Store your medicine away from children and in a safe place  
-Flush unused medicine down the toilet -Call your healthcare provider for medical advice about side effects. You may report side effects to FDA at 9-630-FDA-9748.  
-Please be aware that many medications contain Tylenol. We do not want you to over medicate so please read the information below as a guide. Do not take more than 4 Grams of Tylenol in a 24 hour period. (There are 1000 milligrams in one Gram) Percocet contains 325 mg of Tylenol per tablet (do not take more than 12 tablets in 24 hours) Lortab contains 500 mg of Tylenol per tablet (do not take more than 8 tablets in 24 hours) Norco contains 325 mg of Tylenol per tablet (do not take more than 12 tablets in 24 hours). DO NOT: 
-Do not give your medicine to others  
-Do not take medicine unless it was prescribed for you  
-Do not stop taking your medicine without talking to your healthcare provider  
-Do not break, chew, crush, dissolve, or inject your medicine. If you cannot swallow your medicine whole, talk to your healthcare provider. 
-Do not drink alcohol while taking this medicine 
-Do not take anti-inflammatory medications or aspirin unless instructed by your      Physician. Introducing [facility]! [facility] introduces [employer] patient portal. Now you can access parts of your medical record, email your doctor's office, and request medication refills online. 1. In your internet browser, go to [email or web address] 2. Click on the First Time User? Click Here link in the Sign In box. You will see the New Member Sign Up page. 3. Enter your [employer] Access Code exactly as it appears below.  You will not need to use this code after youve completed the sign-up process. If you do not sign up before the expiration date, you must request a new code. · [employer] Access Code: [email or web address] Expires: 12/1/2018  5:23 AM 
 
4. Enter the last four digits of your Social Security Number (xxxx) and Date of Birth (mm/dd/yyyy) as indicated and click Submit. You will be taken to the next sign-up page. 5. Create a [employer] ID. This will be your [employer] login ID and cannot be changed, so think of one that is secure and easy to remember. 6. Create a [employer] password. You can change your password at any time. 7. Enter your Password Reset Question and Answer. This can be used at a later time if you forget your password. 8. Enter your e-mail address. You will receive e-mail notification when new information is available in [address] 9. Click Sign Up. You can now view and download portions of your medical record. 10. Click the Download Summary menu link to download a portable copy of your medical information. If you have questions, please visit the Frequently Asked Questions section of the [employer] website. Remember, [employer] is NOT to be used for urgent needs. For medical emergencies, dial 911. Now available from your iPhone and Android! Introducing Arthur Gonzalez As a [employer] patient, I wanted to make you aware of our electronic visit tool called Arthur Tahirelizabethandrei. [employer] 24/7 allows you to connect within minutes with a medical provider 24 hours a day, seven days a week via a mobile device or tablet or logging into a secure website from your computer. You can access Arthur Gonzalez from anywhere in the [place].  
 
A virtual visit might be right for you when you have a simple condition and feel like you just dont want to get out of bed, or cant get away from work for an appointment, when your regular [employer] provider is not available (evenings, weekends or holidays), or when youre out of town and need minor care. Electronic visits cost only $49 and if the [employer] 24/7 provider determines a prescription is needed to treat your condition, one can be electronically transmitted to a nearby pharmacy*. Please take a moment to enroll today if you have not already done so. The enrollment process is free and takes just a few minutes. To enroll, please download the [employer] 24/7 kiera to your tablet or phone, or visit [email or web address] to enroll on your computer. And, as an [address] [place] [address] patient with a [employer] account, the results of your visits will be scanned into your electronic medical record and your primary care provider will be able to view the scanned results. We urge you to continue to see your regular [employer] provider for your ongoing medical care. And while your primary care provider may not be the one available when you seek a [employer] virtual visit, the peace of mind you get from getting a real diagnosis real time can be priceless. For more information on [employer], view our Frequently Asked Questions (FAQs) at [email or web address]. Sincerely, 
 
Schuyler Hawley MD 
Chief Medical Officer [employer] *:  certain medications cannot be prescribed via [employer] Unresulted Labs-Please follow up with your PCP about these lab tests Order Current Status CULTURE, BLOOD, PAIRED Preliminary result Providers Seen During Your Hospitalization Provider Specialty Primary office phone Gena Sal MD Orthopedic Surgery [phone] Your Primary Care Physician (PCP) Primary Care Physician Office Phone Office Fax [address] [facility], [address] [place] [address] [phone] You are allergic to the following No active allergies Recent Documentation Height Weight BMI OB Status Smoking Status 1.626 m (!) 162.4 kg 61.45 kg/m2 Postmenopausal Never Smoker Emergency Contacts Name Discharge Info Relation Home Work Mobile Jeff Lopez DISCHARGE CAREGIVER [3] Spouse [3] [phone] Patient Belongings The following personal items are in your possession at time of discharge: 
  Dental Appliances: None  Visual Aid: None      Home Medications: None   Jewelry: None  Clothing: Other (comment) (CLOTHING & SHOES TO PACU)    Other Valuables: None Discharge Instructions Attachments/References METOPROLOL (BY MOUTH) (ENGLISH) Patient Handouts Metoprolol (By mouth) Metoprolol (met-oh-PROE-lol) Treats high blood pressure, angina (chest pain), and heart failure. May lower the risk of death after a heart attack. This medicine is a beta-blocker. Brand Name(s): Lopressor, Toprol XL There may be other brand names for this medicine. When This Medicine Should Not Be Used: This medicine is not right for everyone. Do not use if you had an allergic reaction to metoprolol or similar medicines. Do not use this medicine if you have certain blood circulation or heart problems. Ask your doctor about these problems. How to Use This Medicine:  
Tablet, Long Acting Tablet · Take your medicine as directed. Your dose may need to be changed several times to find what works best for you. · Take this medicine with a meal or right after a meal. Take this medicine the same way every day, at the same time. · Swallow the tablet whole with a glass of water. You may break the extended-release tablet in half, but do not chew or crush it. · Missed dose: Take a dose as soon as you remember. If it is almost time for your next dose, wait until then and take a regular dose. Do not take extra medicine to make up for a missed dose. · Store the medicine in a closed container at room temperature, away from heat, moisture, and direct light. Drugs and Foods to Avoid: Ask your doctor or pharmacist before using any other medicine, including over-the-counter medicines, vitamins, and herbal products. · Some medicines can affect how metoprolol works. Tell your doctor if you are taking any of the following: ¨ Digoxin, dipyridamole, hydralazine, hydroxychloroquine, methyldopa, quinidine ¨ Medicine to treat depression (such as bupropion, clomipramine, desipramine, fluoxetine, fluvoxamine, paroxetine, sertraline), medicine to treat mental illness (such as chlorpromazine, fluphenazine, haloperidol, thioridazine), medicine for heart rhythm problems (such as propafenone), HIV/AIDS medicine (such as ritonavir), medicine to treat a fungus infection (such as terbinafine), a monoamine oxidase inhibitor (MAOI), an ergot medicine for headaches, a calcium channel blocker (such as amlodipine, diltiazem, verapamil), or an alpha blocker (such as clonidine, prazosin, reserpine, guanethidine) Warnings While Using This Medicine: · Tell your doctor if you are pregnant or breastfeeding, or if you have blood vessel, heart, or circulation problems (such as heart failure, rhythm problems, or slow heartbeat). Tell your doctor if you have kidney disease, liver disease, diabetes, lung disease (such as asthma), an overactive thyroid, or a history of allergies. · This medicine may cause worse symptoms of heart failure while the dose is being adjusted. · Do not stop using this medicine suddenly. Your doctor will need to slowly decrease your dose before you stop it completely. · Tell any doctor or dentist who treats you that you are using this medicine. You may need to stop using this medicine several days before you have surgery or medical tests. · This medicine could lower your blood pressure too much, especially when you first use it or if you are dehydrated. Stand or sit up slowly if you feel lightheaded or dizzy. · This medicine may make you dizzy or drowsy.  Do not drive or do anything else that could be dangerous until you know how this medicine affects you. · Your doctor will check your progress and the effects of this medicine at regular visits. Keep all appointments. · Keep all medicine out of the reach of children. Never share your medicine with anyone. Possible Side Effects While Using This Medicine:  
Call your doctor right away if you notice any of these side effects: · Allergic reaction: Itching or hives, swelling in your face or hands, swelling or tingling in your mouth or throat, chest tightness, trouble breathing · Lightheadedness, dizziness, or fainting · Slow heartbeat · Swelling in your hands, ankles, or feet, trouble breathing, tiredness · Worsening chest pain If you notice these less serious side effects, talk with your doctor: · Diarrhea · Mild dizziness or tiredness If you notice other side effects that you think are caused by this medicine, tell your doctor. Call your doctor for medical advice about side effects. You may report side effects to FDA at 3-571-FDA-6656 © 2017 [address] Information is for End User's use only and may not be sold, redistributed or otherwise used for commercial purposes. The above information is an  only. It is not intended as medical advice for individual conditions or treatments. Talk to your doctor, nurse or pharmacist before following any medical regimen to see if it is safe and effective for you. Please provide this summary of care documentation to your next provider. Signatures-by signing, you are acknowledging that this After Visit Summary has been reviewed with you and you have received a copy. Patient Signature:  ____________________________________________________________ Date:  ____________________________________________________________  
  
Brian Hamilton Provider Signature:  ____________________________________________________________ Date:  ____________________________________________________________

## 2018-09-06 NOTE — IP AVS SNAPSHOT
[address] [place] [address] 
[phone] Patient: John Hewitt MRN: [id number] :1970 A check ezra indicates which time of day the medication should be taken. My Medications START taking these medications Instructions Each Dose to Equal  
 Morning Noon Evening Bedtime  
 ferrous sulfate 325 mg (65 mg iron) tablet Commonly known as:  Iron (Ferrous Sulfate) Your last dose was: Your next dose is: Take 1 Tab by mouth Daily (before breakfast). 325 mg  
    
   
   
   
  
 methocarbamol 750 mg tablet Commonly known as:  ROBAXIN Your last dose was: Your next dose is: Take 1 Tab by mouth three (3) times daily. 750 mg  
    
   
   
   
  
 metoprolol tartrate 25 mg tablet Commonly known as:  LOPRESSOR Your last dose was: Your next dose is: Take 1 Tab by mouth every twelve (12) hours. 25 mg  
    
   
   
   
  
 oxyCODONE IR 5 mg immediate release tablet Commonly known as:  Franceen Ly Your last dose was: Your next dose is: Take 1-2 Tabs by mouth every four (4) hours as needed. Max Daily Amount: 60 mg.  
 5-10 mg  
    
   
   
   
  
 senna-docusate 8.6-50 mg per tablet Commonly known as:  Myron Pierre Your last dose was: Your next dose is: Take 1 Tab by mouth two (2) times a day. Indications: constipation, hold for loose stool or diarrhea 1 Tab CONTINUE taking these medications Instructions Each Dose to Equal  
 Morning Noon Evening Bedtime  
 aspirin 81 mg chewable tablet Your last dose was: Your next dose is: Take 81 mg by mouth daily. 81 mg  
    
   
   
   
  
 cholecalciferol 400 unit Tab tablet Commonly known as:  VITAMIN D3 Your last dose was: Your next dose is: Take 800 Units by mouth daily. 800 Units  
    
   
   
   
  
 gabapentin 300 mg capsule Commonly known as:  NEURONTIN Your last dose was: Your next dose is: Take 600 mg by mouth two (2) times a day. 600 mg  
    
   
   
   
  
 telmisartan-hydroCHLOROthiazide 80-12.5 mg per tablet Commonly known as:  MICARDIS HCT Your last dose was: Your next dose is: Take 1 Tab by mouth daily. 1 Tab VITAMIN C 500 mg tablet Generic drug:  ascorbic acid (vitamin C) Your last dose was: Your next dose is: Take  by mouth daily. STOP taking these medications   
 meloxicam 15 mg tablet Commonly known as:  MOBIC Where to Get Your Medications Information on where to get these meds will be given to you by the nurse or doctor. ! Ask your nurse or doctor about these medications  
  ferrous sulfate 325 mg (65 mg iron) tablet  
 methocarbamol 750 mg tablet  
 metoprolol tartrate 25 mg tablet  
 oxyCODONE IR 5 mg immediate release tablet  
 senna-docusate 8.6-50 mg per tablet

## 2018-09-06 NOTE — PROGRESS NOTES
TRANSFER - IN REPORT: 
 
Verbal report received from SAMUEL Crabtree(name) on [facility]  being received from [facility]) for routine post - op Report consisted of patients Situation, Background, Assessment and  
Recommendations(SBAR). Information from the following report(s) SBAR, Kardex, OR Summary, Intake/Output and MAR was reviewed with the receiving nurse. Opportunity for questions and clarification was provided. Assessment completed upon patients arrival to unit and care assumed.

## 2018-09-07 LAB
ANION GAP SERPL CALC-SCNC: 8 MMOL/L (ref 5–15)
BUN SERPL-MCNC: 9 MG/DL (ref 6–20)
BUN/CREAT SERPL: 13 (ref 12–20)
CALCIUM SERPL-MCNC: 8.7 MG/DL (ref 8.5–10.1)
CHLORIDE SERPL-SCNC: 108 MMOL/L (ref 97–108)
CO2 SERPL-SCNC: 23 MMOL/L (ref 21–32)
CREAT SERPL-MCNC: 0.7 MG/DL (ref 0.55–1.02)
GLUCOSE SERPL-MCNC: 140 MG/DL (ref 65–100)
HGB BLD-MCNC: 10.4 G/DL (ref 11.5–16)
POTASSIUM SERPL-SCNC: 3.9 MMOL/L (ref 3.5–5.1)
SODIUM SERPL-SCNC: 139 MMOL/L (ref 136–145)

## 2018-09-07 PROCEDURE — 80048 BASIC METABOLIC PNL TOTAL CA: CPT | Performed by: ORTHOPAEDIC SURGERY

## 2018-09-07 PROCEDURE — 97165 OT EVAL LOW COMPLEX 30 MIN: CPT

## 2018-09-07 PROCEDURE — 97530 THERAPEUTIC ACTIVITIES: CPT

## 2018-09-07 PROCEDURE — 74011250636 HC RX REV CODE- 250/636: Performed by: ORTHOPAEDIC SURGERY

## 2018-09-07 PROCEDURE — 94760 N-INVAS EAR/PLS OXIMETRY 1: CPT

## 2018-09-07 PROCEDURE — 97161 PT EVAL LOW COMPLEX 20 MIN: CPT | Performed by: PHYSICAL THERAPIST

## 2018-09-07 PROCEDURE — 97535 SELF CARE MNGMENT TRAINING: CPT

## 2018-09-07 PROCEDURE — 65270000029 HC RM PRIVATE

## 2018-09-07 PROCEDURE — 74011000250 HC RX REV CODE- 250: Performed by: NURSE PRACTITIONER

## 2018-09-07 PROCEDURE — 74011250637 HC RX REV CODE- 250/637: Performed by: ORTHOPAEDIC SURGERY

## 2018-09-07 PROCEDURE — 36415 COLL VENOUS BLD VENIPUNCTURE: CPT | Performed by: ORTHOPAEDIC SURGERY

## 2018-09-07 PROCEDURE — 85018 HEMOGLOBIN: CPT | Performed by: ORTHOPAEDIC SURGERY

## 2018-09-07 PROCEDURE — 97530 THERAPEUTIC ACTIVITIES: CPT | Performed by: PHYSICAL THERAPIST

## 2018-09-07 PROCEDURE — 74011000250 HC RX REV CODE- 250: Performed by: ORTHOPAEDIC SURGERY

## 2018-09-07 PROCEDURE — 97116 GAIT TRAINING THERAPY: CPT | Performed by: PHYSICAL THERAPIST

## 2018-09-07 RX ORDER — OXYCODONE HYDROCHLORIDE 5 MG/1
5-10 TABLET ORAL
Qty: 60 TAB | Refills: 0 | Status: SHIPPED | OUTPATIENT
Start: 2018-09-07

## 2018-09-07 RX ORDER — POLYETHYLENE GLYCOL 3350 17 G/17G
17 POWDER, FOR SOLUTION ORAL 2 TIMES DAILY
Status: DISCONTINUED | OUTPATIENT
Start: 2018-09-07 | End: 2018-09-12 | Stop reason: HOSPADM

## 2018-09-07 RX ORDER — AMOXICILLIN 250 MG
1 CAPSULE ORAL 2 TIMES DAILY
Qty: 30 TAB | Refills: 0 | Status: SHIPPED | OUTPATIENT
Start: 2018-09-07

## 2018-09-07 RX ADMIN — ACETAMINOPHEN 650 MG: 325 TABLET, FILM COATED ORAL at 23:02

## 2018-09-07 RX ADMIN — OXYCODONE HYDROCHLORIDE 10 MG: 5 TABLET ORAL at 14:33

## 2018-09-07 RX ADMIN — Medication 10 ML: at 16:43

## 2018-09-07 RX ADMIN — OXYCODONE HYDROCHLORIDE 10 MG: 5 TABLET ORAL at 18:32

## 2018-09-07 RX ADMIN — GABAPENTIN 600 MG: 300 CAPSULE ORAL at 18:32

## 2018-09-07 RX ADMIN — OXYCODONE HYDROCHLORIDE 10 MG: 5 TABLET ORAL at 23:00

## 2018-09-07 RX ADMIN — OXYCODONE HYDROCHLORIDE 10 MG: 5 TABLET ORAL at 09:02

## 2018-09-07 RX ADMIN — POLYETHYLENE GLYCOL 3350 17 G: 17 POWDER, FOR SOLUTION ORAL at 18:33

## 2018-09-07 RX ADMIN — ONDANSETRON 4 MG: 2 INJECTION INTRAMUSCULAR; INTRAVENOUS at 12:20

## 2018-09-07 RX ADMIN — ACETAMINOPHEN 650 MG: 325 TABLET, FILM COATED ORAL at 12:43

## 2018-09-07 RX ADMIN — GABAPENTIN 600 MG: 300 CAPSULE ORAL at 09:02

## 2018-09-07 RX ADMIN — ACETAMINOPHEN 650 MG: 325 TABLET, FILM COATED ORAL at 07:00

## 2018-09-07 RX ADMIN — OXYCODONE HYDROCHLORIDE AND ACETAMINOPHEN 500 MG: 500 TABLET ORAL at 09:02

## 2018-09-07 RX ADMIN — SENNOSIDES AND DOCUSATE SODIUM 1 TABLET: 8.6; 5 TABLET ORAL at 18:32

## 2018-09-07 RX ADMIN — POLYETHYLENE GLYCOL 3350 17 G: 17 POWDER, FOR SOLUTION ORAL at 09:02

## 2018-09-07 RX ADMIN — Medication 10 ML: at 22:47

## 2018-09-07 RX ADMIN — SENNOSIDES AND DOCUSATE SODIUM 1 TABLET: 8.6; 5 TABLET ORAL at 09:01

## 2018-09-07 RX ADMIN — DIPHENHYDRAMINE HYDROCHLORIDE 12.5 MG: 12.5 SOLUTION ORAL at 07:00

## 2018-09-07 RX ADMIN — Medication 10 ML: at 06:00

## 2018-09-07 RX ADMIN — ACETAMINOPHEN 650 MG: 325 TABLET, FILM COATED ORAL at 18:32

## 2018-09-07 RX ADMIN — CEFAZOLIN 3 G: 1 INJECTION, POWDER, FOR SOLUTION INTRAMUSCULAR; INTRAVENOUS; PARENTERAL at 02:01

## 2018-09-07 NOTE — PROGRESS NOTES
Problem: Falls - Risk of 
Goal: *Absence of Falls Document Abigail Soliman Fall Risk and appropriate interventions in the flowsheet. Outcome: Progressing Towards Goal 
Fall Risk Interventions: 
Mobility Interventions: PT Consult for mobility concerns Medication Interventions: Patient to call before getting OOB Elimination Interventions: Call light in reach

## 2018-09-07 NOTE — PROGRESS NOTES
Problem: Self Care Deficits Care Plan (Adult) Goal: *Acute Goals and Plan of Care (Insert Text) Occupational Therapy Goals Initiated 9/7/2018 1. Patient will perform lower body dressing with supervision/set-up using AE PRN within 7 days. 2.  Patient will perform toilet transfer with modified independence using most appropriate DME within 7 days. 3.  Patient will grooming at the sink with modified independence within 7 days. 4.  Patient will don/doff back brace at modified independence within 7 days. 5.  Patient will verbalize/demonstrate 3/3 back precautions during ADL tasks without cues within 7 days. Occupational Therapy EVALUATION Patient: Yvonne Syed (46 y.o. female) Date: 9/7/2018 Primary Diagnosis: LUMBAR STENOSIS, LUMBAR DISC HERNIATION Spinal stenosis of lumbar region Procedure(s) (LRB): 
L5-S1 RIGHT FACET CYST REMOVAL,  L5- S1 FUSION WITH MAZOR (REQ MAZOR X) (N/A) 1 Day Post-Op Precautions: Back ASSESSMENT : 
Based on the objective data described below, the patient presents with overall CGA-Mod A for upper body ADLs, Min-Total A for lower body ADLs, and CGA-Max A x2 for functional mobility (increased assist for bed mobility) s/p cyst removal & fusion with jeffy POD #1. 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 Received in supine, agreeable to therapy, very motivated for OOB mobility. Patient requiring Max A x2 for bed mobility with noted pain & dizziness with transition to EOB, provided green bag for emesis, BP 120s/60s (baseline HTN). Nausea clearing, patient agreeable to continued therapy, completing functional transfers & ambulation with RW to hallway & bathroom with CGA. Attempted voiding, however unsuccessful, completed grooming tasks at the sink with cues for maintaining spinal precautions.  Noted difficulty with lower body dressing requiring Total A for socks, unable to tailor sit, will trial lower body AE next session. Returned to the chair, encouraged continued OOB mobility. Pending progress, anticipate patient can d/c with HHOT vs. TBD. Next session: lower body AE education & training Patient will benefit from skilled intervention to address the above impairments. Patients rehabilitation potential is considered to be Good Factors which may influence rehabilitation potential include:  
[]             None noted []             Mental ability/status []             Medical condition []             Home/family situation and support systems []             Safety awareness []             Pain tolerance/management 
[]             Other: PLAN : 
Recommendations and Planned Interventions: 
[x]               Self Care Training                  [x]        Therapeutic Activities [x]               Functional Mobility Training    []        Cognitive Retraining 
[x]               Therapeutic Exercises           [x]        Endurance Activities [x]               Balance Training                   []        Neuromuscular Re-Education []               Visual/Perceptual Training     [x]   Home Safety Training 
[x]               Patient Education                 [x]        Family Training/Education []               Other (comment): Frequency/Duration: Patient will be followed by occupational therapy 5 times a week to address goals. Discharge Recommendations: Home Health vs.To Be Determined Further Equipment Recommendations for Discharge: TBD, may benefit from lower body AE SUBJECTIVE:  
Patient stated I feel a little nauseous.  OBJECTIVE DATA SUMMARY:  
HISTORY:  
Past Medical History:  
Diagnosis Date  Chronic pain BACK  Hypertension  Stroke [facility]) 2004 TIA (SPEECH--NO RESIDUAL) Past Surgical History:  
Procedure Laterality Date  HX ORTHOPAEDIC Left 05/13/2018 WRIST AND ARM R/T CARPAL TUNNEL  
 HX ORTHOPAEDIC Right 2010 WRIST AND ARM R/T CARPAL TUNNEL  HX ORTHOPAEDIC Right 2013 HEEL SPURS Prior Level of Function/Environment/Context: IND-Mod I, no DME/AE. Lives with spouse who works, will be alone at times upon d/c Home Situation Home Environment: Private residence # Steps to Enter: 4 Rails to Enter: Yes Hand Rails : Bilateral 
One/Two Story Residence: One story Living Alone: No 
Support Systems: Spouse/Significant Other/Partner Patient Expects to be Discharged to[de-identified] Private residence Current DME Used/Available at Home: Cane, straight Tub or Shower Type: Tub/Shower combination Hand dominance: Right EXAMINATION OF PERFORMANCE DEFICITS: 
Cognitive/Behavioral Status: 
Neurologic State: Alert Orientation Level: Oriented X4 Cognition: Appropriate for age attention/concentration; Appropriate decision making; Follows commands Perception: Appears intact Perseveration: No perseveration noted Safety/Judgement: Awareness of environment; Fall prevention Skin: Appears intact Edema: None noted in BUEs Hearing: 
  
 
Vision/Perceptual:   
    
    
    
  
    
    
  
 
Range of Motion: In [address] AROM: Generally decreased, functional 
PROM: Generally decreased, functional (back precautions) Strength: In [address] Strength: Within functional limits Coordination: 
Coordination: Within functional limits Fine Motor Skills-Upper: Left Intact; Right Intact Gross Motor Skills-Upper: Left Intact; Right Intact Tone & Sensation: In [address] Tone: Normal 
  
  
  
  
  
  
  
 
Balance: 
Sitting: Intact Standing: Intact; With support Functional Mobility and Transfers for ADLs: 
Bed Mobility: 
Rolling: Maximum assistance;Assist x2 Supine to Sit: Maximum assistance;Assist x2; Additional time Scooting: Supervision Transfers: 
Sit to Stand: Contact guard assistance;Assist x2 Stand to Sit: Contact guard assistance;Assist x2 Toilet Transfer : Contact guard assistance; Adaptive equipment (grab bars) ADL Assessment: 
Feeding: Independent Oral Facial Hygiene/Grooming: Contact guard assistance (Standing at the sink) Bathing: Moderate assistance (for BLEs and standing balance) Upper Body Dressing: Moderate assistance (including LSO brace) Lower Body Dressing: Maximum assistance (decreased BLE ROM) Toileting: Minimum assistance (for bowel hygiene) ADL Intervention and task modifications: 
Feeding Feeding Assistance: Independent Grooming Grooming Assistance: Contact guard assistance (Standing at the sink) Washing Face: Contact guard assistance Washing Hands: Contact guard assistance Brushing Teeth: Contact guard assistance; Compensatory technique training Cues: Verbal cues provided;Visual cues provided Lower Body Dressing Assistance Dressing Assistance: Total assistance(dependent) Socks: Total assistance (dependent) Leg Crossed Method Used: No (unable to complete with BLEs) Position Performed: Supine;Seated edge of bed Cues: Verbal cues provided;Visual cues provided Toileting Toileting Assistance: Contact guard assistance Bladder Hygiene: Supervision/set-up Clothing Management: Contact guard assistance Cues: Verbal cues provided;Visual cues provided Adaptive Equipment: Grab bars; Braden Canavan Cognitive Retraining Safety/Judgement: Awareness of environment; Fall prevention Therapeutic Exercise: 
Ambulation & transfers in room/hallway with RW & CGA Functional Measure: 
Barthel Index: 
 
Bathin Bladder: 10 Bowels: 10 
Groomin Dressin Feeding: 10 Mobility: 0 Stairs: 0 Toilet Use: 5 Transfer (Bed to Chair and Back): 10 Total: 55 Barthel and G-code impairment scale: 
Percentage of impairment CH 
0% CI 
1-19% CJ 
20-39% CK 
40-59% CL 
60-79% CM 
80-99% CN 
100% Barthel Score 0-100 100 99-80 79-60 59-40 20-39 1-19 
 0 Barthel Score 0-20 20 17-19 13-16 9-12 5-8 1-4 0 The Barthel ADL Index: Guidelines 1. The index should be used as a record of what a patient does, not as a record of what a patient could do. 2. The main aim is to establish degree of independence from any help, physical or verbal, however minor and for whatever reason. 3. The need for supervision renders the patient not independent. 4. A patient's performance should be established using the best available evidence. Asking the patient, friends/relatives and nurses are the usual sources, but direct observation and common sense are also important. However direct testing is not needed. 5. Usually the patient's performance over the preceding 24-48 hours is important, but occasionally longer periods will be relevant. 6. Middle categories imply that the patient supplies over 50 per cent of the effort. 7. Use of aids to be independent is allowed. Darryle Chant., Barthel, DCrystalW. (1637). Functional evaluation: the Barthel Index. [address] [facility] (14)2. Myron Ann manpreet HAROON Sharpe, Deniz Marc., Magnus Mir., [facility], [address] [place] [address] (1999). Measuring the change indisability after inpatient rehabilitation; comparison of the responsiveness of the Barthel Index and Functional [place] Measure. Journal of Neurology, Neurosurgery, and Psychiatry, 66(4), 978-855. Donovan Weinstein, N.J.A, ESVIN Segundo, & Marilyn Brewster MKRYSTIAN. (2004.) Assessment of post-stroke quality of life in cost-effectiveness studies: The usefulness of the Barthel Index and the EuroQoL-5D. [facility], 13 152-28 G codes: In compliance with CMSs Claims Based Outcome Reporting, the following G-code set was chosen for this patient based on their primary functional limitation being treated: The outcome measure chosen to determine the severity of the functional limitation was the Barthel Index with a score of 55/100 which was correlated with the impairment scale. ? Self Care:  
  - CURRENT STATUS: CK - 40%-59% impaired, limited or restricted  - GOAL STATUS: CJ - 20%-39% impaired, limited or restricted  - D/C STATUS:  ---------------To be determined--------------- Occupational Therapy Evaluation Charge Determination History Examination Decision-Making LOW Complexity : Brief history review  MEDIUM Complexity : 3-5 performance deficits relating to physical, cognitive , or psychosocial skils that result in activity limitations and / or participation restrictions LOW Complexity : No comorbidities that affect functional and no verbal or physical assistance needed to complete eval tasks Based on the above components, the patient evaluation is determined to be of the following complexity level: LOW Pain: 
Pain Scale 1: Numeric (0 - 10) Pain Intensity 1: 8 Pain Location 1: Back Pain Orientation 1: Lower Pain Description 1: Aching Pain Intervention(s) 1: Encouraged PCA Activity Tolerance:  
Good-Fair, slightly limited by nausea this session Please refer to the flowsheet for vital signs taken during this treatment. After treatment:  
[x] Patient left in no apparent distress sitting up in chair 
[] Patient left in no apparent distress in bed 
[x] Call bell left within reach [x] Nursing notified 
[] Caregiver present 
[] Bed alarm activated COMMUNICATION/EDUCATION:  
The patients plan of care was discussed with: Physical Therapist and Registered Nurse. [x] Home safety education was provided and the patient/caregiver indicated understanding. [x] Patient/family have participated as able in goal setting and plan of care. [x] Patient/family agree to work toward stated goals and plan of care. [] Patient understands intent and goals of therapy, but is neutral about his/her participation. [] Patient is unable to participate in goal setting and plan of care. This patients plan of care is appropriate for delegation to [facility]. Thank you for this referral. 
Gabriel Ventura OT Time Calculation: 48 mins

## 2018-09-07 NOTE — PROGRESS NOTES
Primary Nurse Jamilah Gabriel RN and Froilan Osborne RN performed a dual skin assessment on this patient No impairment noted Glynn score is 23

## 2018-09-07 NOTE — PROGRESS NOTES
Orthopedic Spine Progress Note Post Op day: 1 Day Post-Op 2018 8:04 AM  
 
Kanika Conner Vital Signs:   
Patient Vitals for the past 8 hrs: 
 BP Temp Pulse Resp SpO2  
18 0415 108/74 - 76 - 100 % 18 0316 (!) 89/55 98.4 °F (36.9 °C) 81 16 99 % Temp (24hrs), Av.1 °F (36.7 °C), Min:97.8 °F (36.6 °C), Max:98.8 °F (37.1 °C) Intake/Output: 
  
 1901 -  0700 In: 1100 [I.V.:1100] Out: 6477 [VQMVO:1945; Drains:570] Pain Control:  
Pain Assessment Pain Scale 1: Numeric (0 - 10) Pain Intensity 1: 3 Pain Onset 1: post op Pain Location 1: Back Pain Orientation 1: Lower Pain Description 1: Sore Pain Intervention(s) 1: Encouraged PCA, Rest 
 
LAB:   
Recent Labs  
   18 
 0202 HGB  10.4* Lab Results Component Value Date/Time Sodium 139 2018 02:02 AM  
 Potassium 3.9 2018 02:02 AM  
 Chloride 108 2018 02:02 AM  
 CO2 23 2018 02:02 AM  
 Glucose 140 (H) 2018 02:02 AM  
 BUN 9 2018 02:02 AM  
 Creatinine 0.70 2018 02:02 AM  
 Calcium 8.7 2018 02:02 AM  
 
 
Subjective:  Kanika Conner is a 46 y.o. female s/p a  Procedure(s): 
L5-S1 RIGHT FACET CYST REMOVAL,  L5- S1 FUSION WITH MAZOR (REQ MAZOR X) Procedure(s): 
L5-S1 RIGHT FACET CYST REMOVAL,  L5- S1 FUSION WITH MAZOR (REQ MAZOR X). Tolerating diet. Objective: General: alert, cooperative, no distress. Gastrointestinal:  Soft, non-tender. Neurological: Neurovascular exam within normal limits. Sensation stable. Motor: unchanged C5-T1 and L2-S1. Leg pain much improved Musculoskeletal:  Cliff's sign negative in bilateral lower extremities. Calves soft, supple, non-tender upon palpation or with passive stretch. Skin: Incision - clean, dry and intact. No significant erythema or swelling. Dressing: clean, dry, and intact PT/OT:  
Gait:    
            
 
 
Assessment:  
 s/p Procedure(s): 
 L5-S1 RIGHT FACET CYST REMOVAL,  L5- S1 FUSION WITH MAZOR (REQ MAZOR X) Active Problems: 
  Spinal stenosis of lumbar region (9/6/2018) Plan: 1. Continue PT/OT 2. Continue established methods of pain control 3. VTE Prophylaxes - TEDS &/or SCDs Discharge To:  D/c home when passes PT Signed By: Minal Valle MD

## 2018-09-07 NOTE — PROGRESS NOTES
Problem: Mobility Impaired (Adult and Pediatric) Goal: *Acute Goals and Plan of Care (Insert Text) Physical Therapy Goals Initiated 9/7/2018 1. Patient will move from supine to sit and sit to supine , scoot up and down and roll side to side in bed with independence within 4 days. 2. Patient will perform sit to stand with independence within 4 days. 3. Patient will ambulate with independence for 100 feet with the least restrictive device within 4 days. 4. Patient will ascend/descend 4 stairs with 1 handrail(s) with supervision/set-up within 4 days. 5. Patient will verbalize and demonstrate understanding of spinal precautions (No bending, lifting greater than 5 lbs, or twisting; log-roll technique; frequent repositioning as instructed) within 4 days. physical Therapy EVALUATION Patient: Bina Beatty (46 y.o. female) Date: 9/7/2018 Primary Diagnosis: LUMBAR STENOSIS, LUMBAR DISC HERNIATION Spinal stenosis of lumbar region Procedure(s) (LRB): 
L5-S1 RIGHT FACET CYST REMOVAL,  L5- S1 FUSION WITH MAZOR (REQ MAZOR X) (N/A) 1 Day Post-Op Precautions:   Back ASSESSMENT : 
Based on the objective data described below, the patient presents with impaired  Mobility and ambulation due to above surgery. Patient is cooperative and motivated and despite her pain is ready to get up out of the bed. She is obese and has not changed positions since surgery so required the max assist of the draw sheet to come to her side and then into sitting. Once up she could scoot to the edge of the bed and sit for a bit to get the brace on. She stood with the bed raised and CGA. Nauseated and vomited in sitting and standing but wanted to walk and ambulated 50 feet with a RW. She has a wide based slightly waddling gait due to obesity. She was able to sit on the toilet as her ramirez had been pulled but was unable to go.   She then ambulated to the sink to brush her teeth and wash her hands. Returned to the bedside chair. She is hoping to go home and not rehab and is agreeable to [facility]. She is going to see if she can borrow a RW for home use if needed. Once up out of the bed she did well. It is anticipated she will progress to home with [facility]. Patient will benefit from skilled intervention to address the above impairments. Patients rehabilitation potential is considered to be Good Factors which may influence rehabilitation potential include:  
[x]         None noted 
[]         Mental ability/status []         Medical condition 
[]         Home/family situation and support systems 
[]         Safety awareness 
[]         Pain tolerance/management 
[]         Other: PLAN : 
Recommendations and Planned Interventions: 
[x]           Bed Mobility Training             []    Neuromuscular Re-Education 
[x]           Transfer Training                   []    Orthotic/Prosthetic Training 
[x]           Gait Training                         []    Modalities [x]           Therapeutic Exercises           []    Edema Management/Control 
[]           Therapeutic Activities            [x]    Patient and Family Training/Education 
[]           Other (comment): Frequency/Duration: Patient will be followed by physical therapy  twice daily to address goals. Discharge Recommendations: Home Health Further Equipment Recommendations for Discharge: none SUBJECTIVE:  
Patient stated I need to walk. I know it will make me feel better.  OBJECTIVE DATA SUMMARY:  
HISTORY:   
Past Medical History:  
Diagnosis Date  Chronic pain BACK  Hypertension  Stroke [facility]) 2004 TIA (SPEECH--NO RESIDUAL) Past Surgical History:  
Procedure Laterality Date  HX ORTHOPAEDIC Left 05/13/2018 WRIST AND ARM R/T CARPAL TUNNEL  
 HX ORTHOPAEDIC Right 2010 WRIST AND ARM R/T CARPAL TUNNEL  
 HX ORTHOPAEDIC Right 2013  HEEL SPURS  
 
 Prior Level of Function/Home Situation: lives with her  in a one level home. Has a cane which she used occasionally. Personal factors and/or comorbidities impacting plan of care: Obesity. Home Situation Home Environment: Private residence # Steps to Enter: 4 Rails to Enter: Yes Hand Rails : Bilateral 
One/Two Story Residence: One story Living Alone: No 
Support Systems: Spouse/Significant Other/Partner Patient Expects to be Discharged to[de-identified] Private residence Current DME Used/Available at Home: Cane, straight Tub or Shower Type: Tub/Shower combination EXAMINATION/PRESENTATION/DECISION MAKING:  
Critical Behavior: 
Neurologic State: Alert Orientation Level: Oriented X4 Cognition: Appropriate decision making, Follows commands Hearing: 
  
Skin:  Per nursing. Edema: per nursing. Range Of Motion: 
AROM: Generally decreased, functional (back precautions) PROM: Generally decreased, functional (back precautions) Strength:   
Strength: Within functional limits Tone & Sensation:  
Tone: Normal 
  
  
  
  
  
  
  
  
   
Coordination: 
Coordination: Within functional limits Vision:  
  
Functional Mobility: 
Bed Mobility: 
Rolling: Maximum assistance;Assist x2 Supine to Sit: Maximum assistance;Assist x2; Additional time Scooting: Supervision Transfers: 
Sit to Stand: Contact guard assistance;Assist x2 Stand to Sit: Contact guard assistance;Assist x2 Balance:  
Sitting: Intact Standing: Intact; With support Ambulation/Gait Training: 
Distance (ft): 50 Feet (ft) Assistive Device: Walker, rolling;Gait belt Ambulation - Level of Assistance: Contact guard assistance;Assist x2; Additional time Gait Description (WDL): Exceptions to [facility] Gait Abnormalities: Decreased step clearance;Circumduction;Trunk sway increased Base of Support: Widened Speed/Nallely: Pace decreased (<100 feet/min) Step Length: Right shortened;Left shortened Functional Measure: 
Barthel Index: 
 
Bathin Bladder: 10 Bowels: 10 
Groomin Dressin Feeding: 10 Mobility: 0 Stairs: 0 Toilet Use: 5 Transfer (Bed to Chair and Back): 10 Total: 55 Barthel and G-code impairment scale: 
Percentage of impairment CH 
0% CI 
1-19% CJ 
20-39% CK 
40-59% CL 
60-79% CM 
80-99% CN 
100% Barthel Score 0-100 100 99-80 79-60 59-40 20-39 1-19 
 0 Barthel Score 0-20 20 17-19 13-16 9-12 5-8 1-4 0 The Barthel ADL Index: Guidelines 1. The index should be used as a record of what a patient does, not as a record of what a patient could do. 2. The main aim is to establish degree of independence from any help, physical or verbal, however minor and for whatever reason. 3. The need for supervision renders the patient not independent. 4. A patient's performance should be established using the best available evidence. Asking the patient, friends/relatives and nurses are the usual sources, but direct observation and common sense are also important. However direct testing is not needed. 5. Usually the patient's performance over the preceding 24-48 hours is important, but occasionally longer periods will be relevant. 6. Middle categories imply that the patient supplies over 50 per cent of the effort. 7. Use of aids to be independent is allowed. Bo Zavala., Barthel, D.W. (3691). Functional evaluation: the Barthel Index. [facility] (14)2. HAROON Arias, [facility], [address] [place] [address] (). Measuring the change indisability after inpatient rehabilitation; comparison of the responsiveness of the Barthel Index and Functional [place] Measure. Journal of Neurology, Neurosurgery, and Psychiatry, 66(4), 330-034.  
JUVENTINO Ron.A, ESVIN Segundo, & Ashley Arenas MCrystalA. (2004.) Assessment of post-stroke quality of life in cost-effectiveness studies: The usefulness of the Barthel Index and the EuroQoL-5D. [facility], 13, 867-02 G codes: In compliance with CMSs Claims Based Outcome Reporting, the following G-code set was chosen for this patient based on their primary functional limitation being treated: The outcome measure chosen to determine the severity of the functional limitation was the Barthel with a score of 55/100 which was correlated with the impairment scale. ? Mobility - Walking and Moving Around:  
  - CURRENT STATUS: CK - 40%-59% impaired, limited or restricted  - GOAL STATUS: CJ - 20%-39% impaired, limited or restricted  - D/C STATUS:  ---------------To be determined---------------  
  
 
Pain: 
Pain Scale 1: Numeric (0 - 10) Pain Intensity 1: 8 Pain Location 1: Back Pain Orientation 1: Lower Pain Description 1: Aching Pain Intervention(s) 1: Encouraged PCA Activity Tolerance:  
Good. Please refer to the flowsheet for vital signs taken during this treatment. After treatment:  
[x]         Patient left in no apparent distress sitting up in chair 
[]         Patient left in no apparent distress in bed 
[x]         Call bell left within reach [x]         Nursing notified 
[]         Caregiver present 
[]         Bed alarm activated COMMUNICATION/EDUCATION:  
The patients plan of care was discussed with: Occupational Therapist and Registered Nurse. [x]         Fall prevention education was provided and the patient/caregiver indicated understanding. [x]         Patient/family have participated as able in goal setting and plan of care. [x]         Patient/family agree to work toward stated goals and plan of care. []         Patient understands intent and goals of therapy, but is neutral about his/her participation. []         Patient is unable to participate in goal setting and plan of care. Thank you for this referral. 
Nikolay Horton, PT Time Calculation: 42 mins

## 2018-09-07 NOTE — PROGRESS NOTES
Orthopedic Spine Progress Note Ulisses Osborn AGACNP-BC Work Cell: [phone] Post Op Day: 1 Day Post-Op 2018 12:53 PM  
 
Gunnar Raines HPI:  
  
Gunnar Raines is a 46 y.o. female with progressive back and right leg pain. Pain onset approximately one year ago. She has pain running down the back of her leg and into her foot. She has accompanying numbness as well. Her MRI revealed a facet cyst at L5-S1 to the right with severe facet arthropathy. After a discussion of the risks, benefits, and alternatives, Gunnar Raines consented to undergo a  Procedure(s): 
L5-S1 RIGHT FACET CYST REMOVAL,  L5- S1 FUSION WITH MAZOR (REQ MAZOR X) Subjective  
  
Patient has no complaints. RLE pain improved postoperatively. She is tolerating a diet. Lumbar brace in room. She denies headaches, dizziness, cp, dyspnea, cough, abd pain, fever, chills, or n/v/d. Objective:  
 
Physical Exam: 
General:  Alert and oriented. No acute distress. Respiratory:  Breathing unlabored. Abdomen:  
Extremities: Obese, Soft, non-tender, non-distended, hypoactive bowel sounds No evidence of cyanosis. No edema in extremities Neurologic: 
 
Musculoskeletal:  No new motor deficits. Neurovascular exam within normal limits. Sensation stable. Motor: unchanged  L2-S1. Calves soft, nontender upon palpation and with passive stretch  Moves both upper and lower extremities. Incision: clean, dry, and intact. No significant erythema or swelling. No active drainage noted. Vital Signs:   
Patient Vitals for the past 8 hrs: 
 BP Temp Pulse Resp SpO2  
18 1232 121/71 - 99 - 94 % 18 1100 120/68 - 92 - 99 % 18 0849 109/75 98.4 °F (36.9 °C) 91 18 100 % Temp (24hrs), Av °F (36.7 °C), Min:97.8 °F (36.6 °C), Max:98.4 °F (36.9 °C) Intake/Output: 
  
 1901 -  0700 In: 1100 [I.V.:1100] Out: 0738 [[id number]; Drains:570] Pain Control:  
Pain Assessment Pain Scale 1: Numeric (0 - 10) Pain Intensity 1: 8 Pain Onset 1: post op Pain Location 1: Back Pain Orientation 1: Lower Pain Description 1: Aching Pain Intervention(s) 1: Encouraged PCA 
 
LAB:   
Recent Labs  
   09/07/18 
 0202 HGB  10.4* Lab Results Component Value Date/Time Sodium 139 09/07/2018 02:02 AM  
 Potassium 3.9 09/07/2018 02:02 AM  
 Chloride 108 09/07/2018 02:02 AM  
 CO2 23 09/07/2018 02:02 AM  
 Glucose 140 (H) 09/07/2018 02:02 AM  
 BUN 9 09/07/2018 02:02 AM  
 Creatinine 0.70 09/07/2018 02:02 AM  
 Calcium 8.7 09/07/2018 02:02 AM  
 
 
PT/OT:  
Gait:  Gait Base of Support: Widened Speed/Nallely: Pace decreased (<100 feet/min) Step Length: Right shortened, Left shortened Gait Abnormalities: Decreased step clearance, Circumduction, Trunk sway increased Ambulation - Level of Assistance: Contact guard assistance, Assist x2, Additional time Distance (ft): 50 Feet (ft) Assistive Device: Walker, rolling, Gait belt Assessment/Plan  
  
1. 1 Day Post-Op Procedure(s): 
L5-S1 RIGHT FACET CYST REMOVAL,  L5- S1 FUSION WITH MAZOR (REQ MAZOR X) 
 -Continue PT/OT. Brace when oob 
 -d/c hemovac 
 -Pain control- PRN oxycodone, scheduled APAP, ice to back 
 -ramirez removed this am.  
 -Incentive Spirometer 
 -Tolerating diet 
 -VTE Prophylaxes - TEDS &/or SCDs 2. Chronic hypertension 
 -normotensive postoperatively 
 -cont with home losartan-hctz 3. Morbid obesity 
 -Body mass index is 55.61 kg/(m^2). -Encourage weight loss with diet and exercise program once cleared by orthopedic surgeon 4. Acute postoperative blood loss anemia -hgb 10.4 
 -anticipated blood loss anemia perioperatively. Continue to monitor Plan above discussed with Dr. Saturnino Benites Discharge To: Home with home health possibly tomorrow pending PT/OT progress Signed By: Porter Romeo NP

## 2018-09-07 NOTE — PROGRESS NOTES
Bedside and Verbal shift change report given to Santana Lou, [facility] (oncoming nurse) by Jovon Santo RN (offgoing nurse). Report included the following information SBAR, Kardex, Intake/Output, MAR and Recent Results.

## 2018-09-07 NOTE — PROGRESS NOTES
Reason for Admission:   Spinal stenosis of lumbar region and morbid obesity. RRAT Score:  2 Plan for utilizing home health:  [facility] unable to offer OT services until 9/17/18 per Latonya owen. Referral placed to [facility] and accepted. Likelihood of Readmission:  Low Transition of Care Plan:  [facility] PT/OT with [facility]. Reviewed medical chart; met with the patient at the bedside along with her . Patient lives with her  in [place], [place]. She uses a cane to get around as needed. Patient has a PCP - Dr. Flaca Mejia and gets her prescriptions at [facility] on [facility]. Home health referral placed to [facility] and accepted. Patient's  is prepared to drive her home tomorrow. Care Management will continue to follow her disposition. SABRINA Newberry Care Management Interventions PCP Verified by CM: Yes 
Palliative Care Criteria Met (RRAT>21 & CHF Dx)?: No 
Mode of Transport at Discharge:  (Patient will travel home via car.  ) Transition of Care Consult (CM Consult): Home Health [address] [place] [address] Road: No 
Reason Outside [place] [id number] Chosen: Unable to staff case (Unable to offer OT services until 9/17/18 per Latonya owen.  ) MyChart Signup: No 
Discharge Durable Medical Equipment: No (Patient has a cane.  ) Physical Therapy Consult: Yes Occupational Therapy Consult: Yes Speech Therapy Consult: No 
Current Support Network: Lives with Spouse Confirm Follow Up Transport:  (Patient will travel home via car.  ) Plan discussed with Pt/Family/Caregiver: Yes Freedom of Choice Offered: Yes Discharge Location Discharge Placement: Home with home health

## 2018-09-07 NOTE — PROGRESS NOTES
Problem: Mobility Impaired (Adult and Pediatric) Goal: *Acute Goals and Plan of Care (Insert Text) Physical Therapy Goals Initiated 9/7/2018 1. Patient will move from supine to sit and sit to supine , scoot up and down and roll side to side in bed with independence within 4 days. 2. Patient will perform sit to stand with independence within 4 days. 3. Patient will ambulate with independence for 100 feet with the least restrictive device within 4 days. 4. Patient will ascend/descend 4 stairs with 1 handrail(s) with supervision/set-up within 4 days. 5. Patient will verbalize and demonstrate understanding of spinal precautions (No bending, lifting greater than 5 lbs, or twisting; log-roll technique; frequent repositioning as instructed) within 4 days. physical Therapy TREATMENT Patient: Gunnar Raines (46 y.o. female) Date: 9/7/2018 Precautions: Back Chart, physical therapy assessment, plan of care and goals were reviewed. ASSESSMENT: 
Patient is up in the chair on arrival.  Stood with the RW with CGA. Ambulated 125 feet slowly with the RW with a wide based gait. She is standing up straighter this afternoon. She returned to the room and used the bathroom, washed her hands at the sink, and then went back to bed. The total time up was 20 minutes. She moves slowly but is steady with gait and better with mobility this afternoon. She is agreeable to [facility]. She used a RW today and we discussed ordering one for home use but agreed to hold on the RW because she is not sure she is going to need it and may be able to borrow one. She anticipates doing the steps and being cleared for disch tomorrow. Cooperative and motivated. Her  is present and supportive. She will have family helping her at home. Progression toward goals: 
[x]      Improving appropriately and progressing toward goals 
[]      Improving slowly and progressing toward goals []      Not making progress toward goals and plan of care will be adjusted PLAN: 
Patient continues to benefit from skilled intervention to address the above impairments. Continue treatment per established plan of care. Discharge Recommendations:  Home Health Further Equipment Recommendations for Discharge:  none SUBJECTIVE:  
Patient stated I am moving better this afternoon.  The patient stated 3/3 back precautions. Reviewed all 3 with patient. OBJECTIVE DATA SUMMARY:  
Functional Mobility Training: 
Bed Mobility: 
Log Rolling: Maximum assistance;Assist x2 Supine to Sit: Maximum assistance;Assist x2; Additional time Sit to Supine: Minimum assistance Scooting: Supervision Brace doffed with  minimal assistance/contact guard assist  
Transfers: 
Sit to Stand: Contact guard assistance Stand to Sit: Contact guard assistance Ambulation/Gait Training: 
Distance (ft): 125 Feet (ft) Assistive Device: Walker, rolling;Gait belt;Brace/Splint Ambulation - Level of Assistance: Contact guard assistance Gait Description (WDL): Exceptions to [facility] Gait Abnormalities: Decreased step clearance Base of Support: Widened Speed/Nallely: Pace decreased (<100 feet/min) Step Length: Right shortened;Left shortened Wide based slow gait with a RW. Therapeutic Exercises:  
She has been doing ankle pumps and LAQ while sitting up. Pain: 
Pain Scale 1: Numeric (0 - 10) Pain Intensity 1: 5 Pain Location 1: Back Pain Orientation 1: Lower Pain Description 1: Aching Pain Intervention(s) 1: Medication (see MAR) Activity Tolerance:  
Good. Please refer to the flowsheet for vital signs taken during this treatment. After treatment:  
[]  Patient left in no apparent distress sitting up in chair 
[x]  Patient left in no apparent distress in bed 
[x]  Call bell left within reach [x]  Nursing notified 
[x]  Caregiver present []  Bed alarm activated COMMUNICATION/COLLABORATION:  
The patients plan of care was discussed with: Registered Nurse Dorethea Hamman, PT Time Calculation: 30 mins

## 2018-09-08 ENCOUNTER — APPOINTMENT (OUTPATIENT)
Dept: GENERAL RADIOLOGY | Age: 48
DRG: 460 | End: 2018-09-08
Attending: INTERNAL MEDICINE
Payer: COMMERCIAL

## 2018-09-08 LAB
ALBUMIN SERPL-MCNC: 2.3 G/DL (ref 3.5–5)
ALBUMIN/GLOB SERPL: 0.7 {RATIO} (ref 1.1–2.2)
ALP SERPL-CCNC: 60 U/L (ref 45–117)
ALT SERPL-CCNC: 22 U/L (ref 12–78)
ANION GAP SERPL CALC-SCNC: 8 MMOL/L (ref 5–15)
AST SERPL-CCNC: 23 U/L (ref 15–37)
BASOPHILS # BLD: 0 K/UL (ref 0–0.1)
BASOPHILS NFR BLD: 0 % (ref 0–1)
BILIRUB SERPL-MCNC: 0.4 MG/DL (ref 0.2–1)
BUN SERPL-MCNC: 6 MG/DL (ref 6–20)
BUN/CREAT SERPL: 9 (ref 12–20)
CALCIUM SERPL-MCNC: 8.5 MG/DL (ref 8.5–10.1)
CHLORIDE SERPL-SCNC: 106 MMOL/L (ref 97–108)
CO2 SERPL-SCNC: 27 MMOL/L (ref 21–32)
CREAT SERPL-MCNC: 0.67 MG/DL (ref 0.55–1.02)
DIFFERENTIAL METHOD BLD: ABNORMAL
EOSINOPHIL # BLD: 0.1 K/UL (ref 0–0.4)
EOSINOPHIL NFR BLD: 0 % (ref 0–7)
ERYTHROCYTE [DISTWIDTH] IN BLOOD BY AUTOMATED COUNT: 14 % (ref 11.5–14.5)
ERYTHROCYTE [DISTWIDTH] IN BLOOD BY AUTOMATED COUNT: 14.1 % (ref 11.5–14.5)
GLOBULIN SER CALC-MCNC: 3.5 G/DL (ref 2–4)
GLUCOSE SERPL-MCNC: 130 MG/DL (ref 65–100)
HCT VFR BLD AUTO: 25.9 % (ref 35–47)
HCT VFR BLD AUTO: 36.6 % (ref 35–47)
HGB BLD-MCNC: 10.9 G/DL (ref 11.5–16)
HGB BLD-MCNC: 8.1 G/DL (ref 11.5–16)
IMM GRANULOCYTES # BLD: 0.1 K/UL (ref 0–0.04)
IMM GRANULOCYTES NFR BLD AUTO: 1 % (ref 0–0.5)
LACTATE SERPL-SCNC: 0.8 MMOL/L (ref 0.4–2)
LYMPHOCYTES # BLD: 2.3 K/UL (ref 0.8–3.5)
LYMPHOCYTES NFR BLD: 15 % (ref 12–49)
MCH RBC QN AUTO: 30.6 PG (ref 26–34)
MCH RBC QN AUTO: 30.7 PG (ref 26–34)
MCHC RBC AUTO-ENTMCNC: 29.8 G/DL (ref 30–36.5)
MCHC RBC AUTO-ENTMCNC: 31.3 G/DL (ref 30–36.5)
MCV RBC AUTO: 103.1 FL (ref 80–99)
MCV RBC AUTO: 97.7 FL (ref 80–99)
MONOCYTES # BLD: 1.4 K/UL (ref 0–1)
MONOCYTES NFR BLD: 10 % (ref 5–13)
NEUTS SEG # BLD: 10.9 K/UL (ref 1.8–8)
NEUTS SEG NFR BLD: 74 % (ref 32–75)
NRBC # BLD: 0 K/UL (ref 0–0.01)
NRBC # BLD: 0 K/UL (ref 0–0.01)
NRBC BLD-RTO: 0 PER 100 WBC
NRBC BLD-RTO: 0 PER 100 WBC
PLATELET # BLD AUTO: 218 K/UL (ref 150–400)
PLATELET # BLD AUTO: 47 K/UL (ref 150–400)
PMV BLD AUTO: 10.3 FL (ref 8.9–12.9)
PMV BLD AUTO: 10.6 FL (ref 8.9–12.9)
POTASSIUM SERPL-SCNC: 3.2 MMOL/L (ref 3.5–5.1)
PROT SERPL-MCNC: 5.8 G/DL (ref 6.4–8.2)
RBC # BLD AUTO: 2.65 M/UL (ref 3.8–5.2)
RBC # BLD AUTO: 3.55 M/UL (ref 3.8–5.2)
SODIUM SERPL-SCNC: 141 MMOL/L (ref 136–145)
WBC # BLD AUTO: 14.7 K/UL (ref 3.6–11)
WBC # BLD AUTO: 17.4 K/UL (ref 3.6–11)

## 2018-09-08 PROCEDURE — 85027 COMPLETE CBC AUTOMATED: CPT | Performed by: ORTHOPAEDIC SURGERY

## 2018-09-08 PROCEDURE — 71045 X-RAY EXAM CHEST 1 VIEW: CPT

## 2018-09-08 PROCEDURE — 87040 BLOOD CULTURE FOR BACTERIA: CPT | Performed by: INTERNAL MEDICINE

## 2018-09-08 PROCEDURE — 80053 COMPREHEN METABOLIC PANEL: CPT | Performed by: INTERNAL MEDICINE

## 2018-09-08 PROCEDURE — 97116 GAIT TRAINING THERAPY: CPT

## 2018-09-08 PROCEDURE — 74011250636 HC RX REV CODE- 250/636: Performed by: INTERNAL MEDICINE

## 2018-09-08 PROCEDURE — 36600 WITHDRAWAL OF ARTERIAL BLOOD: CPT

## 2018-09-08 PROCEDURE — 36415 COLL VENOUS BLD VENIPUNCTURE: CPT | Performed by: ORTHOPAEDIC SURGERY

## 2018-09-08 PROCEDURE — 83605 ASSAY OF LACTIC ACID: CPT | Performed by: INTERNAL MEDICINE

## 2018-09-08 PROCEDURE — 74011000258 HC RX REV CODE- 258: Performed by: INTERNAL MEDICINE

## 2018-09-08 PROCEDURE — 85025 COMPLETE CBC W/AUTO DIFF WBC: CPT | Performed by: INTERNAL MEDICINE

## 2018-09-08 PROCEDURE — 65270000029 HC RM PRIVATE

## 2018-09-08 PROCEDURE — 74011000250 HC RX REV CODE- 250: Performed by: NURSE PRACTITIONER

## 2018-09-08 PROCEDURE — 94760 N-INVAS EAR/PLS OXIMETRY 1: CPT

## 2018-09-08 PROCEDURE — 74011250637 HC RX REV CODE- 250/637: Performed by: ORTHOPAEDIC SURGERY

## 2018-09-08 RX ORDER — SODIUM CHLORIDE 9 MG/ML
125 INJECTION, SOLUTION INTRAVENOUS CONTINUOUS
Status: DISPENSED | OUTPATIENT
Start: 2018-09-08 | End: 2018-09-09

## 2018-09-08 RX ADMIN — SODIUM CHLORIDE 125 ML/HR: 900 INJECTION, SOLUTION INTRAVENOUS at 21:36

## 2018-09-08 RX ADMIN — Medication 10 ML: at 22:09

## 2018-09-08 RX ADMIN — PIPERACILLIN SODIUM,TAZOBACTAM SODIUM 3.38 G: 3; .375 INJECTION, POWDER, FOR SOLUTION INTRAVENOUS at 21:36

## 2018-09-08 RX ADMIN — ACETAMINOPHEN 650 MG: 325 TABLET, FILM COATED ORAL at 14:26

## 2018-09-08 RX ADMIN — Medication 10 ML: at 14:00

## 2018-09-08 RX ADMIN — GABAPENTIN 600 MG: 300 CAPSULE ORAL at 18:16

## 2018-09-08 RX ADMIN — ACETAMINOPHEN 650 MG: 325 TABLET, FILM COATED ORAL at 06:14

## 2018-09-08 RX ADMIN — OXYCODONE HYDROCHLORIDE 10 MG: 5 TABLET ORAL at 22:08

## 2018-09-08 RX ADMIN — OXYCODONE HYDROCHLORIDE 10 MG: 5 TABLET ORAL at 18:16

## 2018-09-08 RX ADMIN — Medication 10 ML: at 06:14

## 2018-09-08 RX ADMIN — POLYETHYLENE GLYCOL 3350 17 G: 17 POWDER, FOR SOLUTION ORAL at 10:23

## 2018-09-08 RX ADMIN — POLYETHYLENE GLYCOL 3350 17 G: 17 POWDER, FOR SOLUTION ORAL at 18:16

## 2018-09-08 RX ADMIN — OXYCODONE HYDROCHLORIDE 10 MG: 5 TABLET ORAL at 01:59

## 2018-09-08 RX ADMIN — OXYCODONE HYDROCHLORIDE AND ACETAMINOPHEN 500 MG: 500 TABLET ORAL at 10:23

## 2018-09-08 RX ADMIN — SENNOSIDES AND DOCUSATE SODIUM 1 TABLET: 8.6; 5 TABLET ORAL at 10:23

## 2018-09-08 RX ADMIN — SENNOSIDES AND DOCUSATE SODIUM 1 TABLET: 8.6; 5 TABLET ORAL at 18:17

## 2018-09-08 RX ADMIN — OXYCODONE HYDROCHLORIDE 10 MG: 5 TABLET ORAL at 06:15

## 2018-09-08 RX ADMIN — ACETAMINOPHEN 650 MG: 325 TABLET, FILM COATED ORAL at 20:48

## 2018-09-08 RX ADMIN — GABAPENTIN 600 MG: 300 CAPSULE ORAL at 10:23

## 2018-09-08 RX ADMIN — OXYCODONE HYDROCHLORIDE 10 MG: 5 TABLET ORAL at 10:23

## 2018-09-08 NOTE — PROGRESS NOTES
Bedside, Verbal and Written shift change report given to Orquidea (oncoming nurse) by Edil Purvis (offgoing nurse). Report included the following information SBAR, Kardex, OR Summary, Procedure Summary, MAR, Accordion, Recent Results and Med Rec Status. 56 - RN paged on call physicianFreddy, regarding pt critical lab value plateletes [id number] and WBC 17.1. Awaiting callback. [phone] - Per [facility], continue to monitor. No orders received. 2033 - RN paged on call physicianFreddy, regarding pt MEWS score 5. Awaiting callback. Orders received for hospitalist consult. Consult called to Sun. 
2102 - RN received call for Thomas Codnon. Orders received. 65 - Nurse supervisor paged for assistance with IV access. IV access team not on shift at this time. 2252 - Per Shala contact critical care transport. Transporter not available. 46 - RN contacted ED for assistance with IV access and spoke with Sara Browne RN. Per Sara Browne, someone will be able to assist and on the way \"in a little bit. \"

## 2018-09-08 NOTE — PROGRESS NOTES
Problem: Mobility Impaired (Adult and Pediatric) Goal: *Acute Goals and Plan of Care (Insert Text) Physical Therapy Goals Initiated 9/7/2018 1. Patient will move from supine to sit and sit to supine , scoot up and down and roll side to side in bed with independence within 4 days. 2. Patient will perform sit to stand with independence within 4 days. 3. Patient will ambulate with independence for 100 feet with the least restrictive device within 4 days. 4. Patient will ascend/descend 4 stairs with 1 handrail(s) with supervision/set-up within 4 days. 5. Patient will verbalize and demonstrate understanding of spinal precautions (No bending, lifting greater than 5 lbs, or twisting; log-roll technique; frequent repositioning as instructed) within 4 days. physical Therapy TREATMENT Patient: Maicol Lopez (46 y.o. female) Date: 9/8/2018 Diagnosis: LUMBAR STENOSIS, LUMBAR DISC HERNIATION Spinal stenosis of lumbar region <principal problem not specified> Procedure(s) (LRB): 
L5-S1 RIGHT FACET CYST REMOVAL,  L5- S1 FUSION WITH MAZOR (REQ MAZOR X) (N/A) 2 Days Post-Op Precautions: Back Chart, physical therapy assessment, plan of care and goals were reviewed. ASSESSMENT: 
Pt cleared by nsg  Pt up in chair with back brace on. Pt required min assist and two attempts to stand from chair, was unsteady when first standing up. Pt able to gait 80 feet and back, wanting to attempt steps as she wishes to go home today. Pt leaning somewhat heavily on walker today, stating she had just took her pain medicaiton not too long ago. Attempted to do steps with use of right railing, pt facing rail  Pt required mod assist to complete, but was very unsteady and was not safe during this activity. Pt returned to room. Pt stating she does not have a walker she can borrow and will need one.   Bariatric sized walker will be ordered, however unsure if this will be able to be done as it is a weekend. Pt currently not cleared for discharge from PT perspective. Progression toward goals: 
[]      Improving appropriately and progressing toward goals [x]      Improving slowly and progressing toward goals 
[]      Not making progress toward goals and plan of care will be adjusted PLAN: 
Patient continues to benefit from skilled intervention to address the above impairments. Continue treatment per established plan of care. Discharge Recommendations:  Home Health Further Equipment Recommendations for Discharge:  Bariatric sized walker SUBJECTIVE:  
Patient stated I want to go home.  The patient stated 3/3 back precautions. Reviewed all 3 with patient. OBJECTIVE DATA SUMMARY:  
Critical Behavior: 
Neurologic State: Alert Orientation Level: Oriented X4 Cognition: Appropriate decision making, Appropriate for age attention/concentration, Appropriate safety awareness, Follows commands Safety/Judgement: Awareness of environment, Fall prevention Functional Mobility Training: 
Bed Mobility: Log   
  
  
  
  
  
Brace donned with  Nursing prior to session Transfers: 
Sit to Stand: Assist x1;Minimum assistance (required two attempts) Stand to Sit: Contact guard assistance Balance: 
Sitting: Intact Standing: Impaired Standing - Static: Good Standing - Dynamic : Fair Ambulation/Gait Training: 
Distance (ft): 80 Feet (ft) Ambulation - Level of Assistance: Assist x1;Minimal assistance Gait Abnormalities: Decreased step clearance; Antalgic;Trunk sway increased (lateral trunk sway) Base of Support: Widened Speed/Nallely: Slow Step Length: Right shortened;Left shortened Stairs: 
Number of Stairs Trained: 2 Stairs - Level of Assistance: Assist X1;Moderate assistance Rail Use: Right  (both hands on right ) Pain: 
Pain Scale 1: Numeric (0 - 10) Pain Intensity 1: 7 Pain Location 1: Back Pain Orientation 1: Lower;Right Pain Description 1: Sore;Pressure Pain Intervention(s) 1: Medication (see MAR) Activity Tolerance:  
Poor, limited by pain Please refer to the flowsheet for vital signs taken during this treatment. After treatment:  
[x]  Patient left in no apparent distress sitting up in chair 
[]  Patient left in no apparent distress in bed 
[x]  Call bell left within reach [x]  Nursing notified 
[]  Caregiver present 
[]  Bed alarm activated COMMUNICATION/COLLABORATION:  
The patients plan of care was discussed with: Registered Nurse Ana Villagomez PT Time Calculation: 24 mins

## 2018-09-08 NOTE — PROGRESS NOTES
POD#2 s/p L5-S1 fusion C/o chills, Denies back pain, Denies urinary issues, Passing flatus, Denies SOB T99.5 /69  P 111 R18 Lumber dressing c/d/i 
NVI Plan: Doing well Will send cbc and monitor vitals Will encourage inspirometer use Tylenol

## 2018-09-08 NOTE — PROGRESS NOTES
Bedside and Verbal shift change report given to Fernando Saldivar (oncoming nurse) by Mehreen Rowe RN (offgoing nurse). Report included the following information SBAR, Kardex, OR Summary, Intake/Output and MAR.

## 2018-09-09 ENCOUNTER — APPOINTMENT (OUTPATIENT)
Dept: CT IMAGING | Age: 48
DRG: 460 | End: 2018-09-09
Attending: INTERNAL MEDICINE
Payer: COMMERCIAL

## 2018-09-09 LAB
ALBUMIN SERPL-MCNC: 2.5 G/DL (ref 3.5–5)
ALBUMIN/GLOB SERPL: 0.7 {RATIO} (ref 1.1–2.2)
ALP SERPL-CCNC: 65 U/L (ref 45–117)
ALT SERPL-CCNC: 20 U/L (ref 12–78)
ANION GAP SERPL CALC-SCNC: 6 MMOL/L (ref 5–15)
APPEARANCE UR: ABNORMAL
AST SERPL-CCNC: 23 U/L (ref 15–37)
ATRIAL RATE: 28 BPM
BACTERIA URNS QL MICRO: NEGATIVE /HPF
BILIRUB SERPL-MCNC: 0.3 MG/DL (ref 0.2–1)
BILIRUB UR QL: NEGATIVE
BUN SERPL-MCNC: 5 MG/DL (ref 6–20)
BUN/CREAT SERPL: 8 (ref 12–20)
CALCIUM SERPL-MCNC: 8.3 MG/DL (ref 8.5–10.1)
CALCULATED R AXIS, ECG10: 15 DEGREES
CALCULATED T AXIS, ECG11: -56 DEGREES
CHLORIDE SERPL-SCNC: 106 MMOL/L (ref 97–108)
CO2 SERPL-SCNC: 29 MMOL/L (ref 21–32)
COLOR UR: ABNORMAL
CREAT SERPL-MCNC: 0.64 MG/DL (ref 0.55–1.02)
DIAGNOSIS, 93000: NORMAL
EPITH CASTS URNS QL MICRO: ABNORMAL /LPF
ERYTHROCYTE [DISTWIDTH] IN BLOOD BY AUTOMATED COUNT: 13.9 % (ref 11.5–14.5)
GLOBULIN SER CALC-MCNC: 3.6 G/DL (ref 2–4)
GLUCOSE BLD STRIP.AUTO-MCNC: 107 MG/DL (ref 65–100)
GLUCOSE BLD STRIP.AUTO-MCNC: 119 MG/DL (ref 65–100)
GLUCOSE SERPL-MCNC: 118 MG/DL (ref 65–100)
GLUCOSE UR STRIP.AUTO-MCNC: NEGATIVE MG/DL
HCT VFR BLD AUTO: 27.5 % (ref 35–47)
HGB BLD-MCNC: 8.7 G/DL (ref 11.5–16)
HGB UR QL STRIP: NEGATIVE
HYALINE CASTS URNS QL MICRO: ABNORMAL /LPF (ref 0–5)
KETONES UR QL STRIP.AUTO: NEGATIVE MG/DL
LEUKOCYTE ESTERASE UR QL STRIP.AUTO: NEGATIVE
MAGNESIUM SERPL-MCNC: 1.8 MG/DL (ref 1.6–2.4)
MCH RBC QN AUTO: 30.7 PG (ref 26–34)
MCHC RBC AUTO-ENTMCNC: 31.6 G/DL (ref 30–36.5)
MCV RBC AUTO: 97.2 FL (ref 80–99)
NITRITE UR QL STRIP.AUTO: NEGATIVE
NRBC # BLD: 0 K/UL (ref 0–0.01)
NRBC BLD-RTO: 0 PER 100 WBC
PH UR STRIP: 6.5 [PH] (ref 5–8)
PLATELET # BLD AUTO: 222 K/UL (ref 150–400)
PMV BLD AUTO: 10 FL (ref 8.9–12.9)
POTASSIUM SERPL-SCNC: 3.1 MMOL/L (ref 3.5–5.1)
PROT SERPL-MCNC: 6.1 G/DL (ref 6.4–8.2)
PROT UR STRIP-MCNC: ABNORMAL MG/DL
Q-T INTERVAL, ECG07: 274 MS
QRS DURATION, ECG06: 88 MS
QTC CALCULATION (BEZET), ECG08: 471 MS
RBC # BLD AUTO: 2.83 M/UL (ref 3.8–5.2)
RBC #/AREA URNS HPF: ABNORMAL /HPF (ref 0–5)
SERVICE CMNT-IMP: ABNORMAL
SERVICE CMNT-IMP: ABNORMAL
SODIUM SERPL-SCNC: 141 MMOL/L (ref 136–145)
SP GR UR REFRACTOMETRY: 1.02 (ref 1–1.03)
UA: UC IF INDICATED,UAUC: ABNORMAL
UROBILINOGEN UR QL STRIP.AUTO: 0.2 EU/DL (ref 0.2–1)
VENTRICULAR RATE, ECG03: 178 BPM
WBC # BLD AUTO: 15.6 K/UL (ref 3.6–11)
WBC URNS QL MICRO: ABNORMAL /HPF (ref 0–4)

## 2018-09-09 PROCEDURE — 74011250637 HC RX REV CODE- 250/637: Performed by: ORTHOPAEDIC SURGERY

## 2018-09-09 PROCEDURE — 85027 COMPLETE CBC AUTOMATED: CPT | Performed by: NURSE PRACTITIONER

## 2018-09-09 PROCEDURE — 97116 GAIT TRAINING THERAPY: CPT

## 2018-09-09 PROCEDURE — 74011250636 HC RX REV CODE- 250/636: Performed by: INTERNAL MEDICINE

## 2018-09-09 PROCEDURE — 81001 URINALYSIS AUTO W/SCOPE: CPT | Performed by: ORTHOPAEDIC SURGERY

## 2018-09-09 PROCEDURE — 74011000258 HC RX REV CODE- 258: Performed by: INTERNAL MEDICINE

## 2018-09-09 PROCEDURE — 74011250637 HC RX REV CODE- 250/637: Performed by: INTERNAL MEDICINE

## 2018-09-09 PROCEDURE — 74011000250 HC RX REV CODE- 250: Performed by: INTERNAL MEDICINE

## 2018-09-09 PROCEDURE — 80053 COMPREHEN METABOLIC PANEL: CPT | Performed by: NURSE PRACTITIONER

## 2018-09-09 PROCEDURE — 65620000000 HC RM CCU GENERAL

## 2018-09-09 PROCEDURE — 93306 TTE W/DOPPLER COMPLETE: CPT

## 2018-09-09 PROCEDURE — 83735 ASSAY OF MAGNESIUM: CPT | Performed by: NURSE PRACTITIONER

## 2018-09-09 PROCEDURE — 71275 CT ANGIOGRAPHY CHEST: CPT

## 2018-09-09 PROCEDURE — 74011000250 HC RX REV CODE- 250: Performed by: NURSE PRACTITIONER

## 2018-09-09 PROCEDURE — 74011636320 HC RX REV CODE- 636/320: Performed by: ORTHOPAEDIC SURGERY

## 2018-09-09 PROCEDURE — 36415 COLL VENOUS BLD VENIPUNCTURE: CPT | Performed by: NURSE PRACTITIONER

## 2018-09-09 PROCEDURE — 93005 ELECTROCARDIOGRAM TRACING: CPT

## 2018-09-09 PROCEDURE — 82962 GLUCOSE BLOOD TEST: CPT

## 2018-09-09 PROCEDURE — 74011000258 HC RX REV CODE- 258: Performed by: ORTHOPAEDIC SURGERY

## 2018-09-09 RX ORDER — POTASSIUM CHLORIDE 750 MG/1
40 TABLET, FILM COATED, EXTENDED RELEASE ORAL
Status: COMPLETED | OUTPATIENT
Start: 2018-09-09 | End: 2018-09-09

## 2018-09-09 RX ORDER — SODIUM CHLORIDE 0.9 % (FLUSH) 0.9 %
10 SYRINGE (ML) INJECTION
Status: COMPLETED | OUTPATIENT
Start: 2018-09-09 | End: 2018-09-09

## 2018-09-09 RX ORDER — METOPROLOL TARTRATE 5 MG/5ML
5 INJECTION INTRAVENOUS ONCE
Status: COMPLETED | OUTPATIENT
Start: 2018-09-09 | End: 2018-09-09

## 2018-09-09 RX ORDER — MAGNESIUM SULFATE 1 G/100ML
1 INJECTION INTRAVENOUS ONCE
Status: COMPLETED | OUTPATIENT
Start: 2018-09-09 | End: 2018-09-09

## 2018-09-09 RX ORDER — POTASSIUM CHLORIDE 14.9 MG/ML
10 INJECTION INTRAVENOUS
Status: COMPLETED | OUTPATIENT
Start: 2018-09-09 | End: 2018-09-09

## 2018-09-09 RX ORDER — SODIUM CHLORIDE 9 MG/ML
500 INJECTION, SOLUTION INTRAVENOUS ONCE
Status: COMPLETED | OUTPATIENT
Start: 2018-09-09 | End: 2018-09-09

## 2018-09-09 RX ORDER — MAGNESIUM SULFATE HEPTAHYDRATE 40 MG/ML
2 INJECTION, SOLUTION INTRAVENOUS ONCE
Status: DISCONTINUED | OUTPATIENT
Start: 2018-09-09 | End: 2018-09-09

## 2018-09-09 RX ADMIN — OXYCODONE HYDROCHLORIDE 10 MG: 5 TABLET ORAL at 07:53

## 2018-09-09 RX ADMIN — METOPROLOL TARTRATE 5 MG: 5 INJECTION, SOLUTION INTRAVENOUS at 01:50

## 2018-09-09 RX ADMIN — OXYCODONE HYDROCHLORIDE AND ACETAMINOPHEN 500 MG: 500 TABLET ORAL at 08:24

## 2018-09-09 RX ADMIN — PIPERACILLIN SODIUM,TAZOBACTAM SODIUM 3.38 G: 3; .375 INJECTION, POWDER, FOR SOLUTION INTRAVENOUS at 13:26

## 2018-09-09 RX ADMIN — IOPAMIDOL 80 ML: 755 INJECTION, SOLUTION INTRAVENOUS at 10:00

## 2018-09-09 RX ADMIN — ACETAMINOPHEN 650 MG: 325 TABLET, FILM COATED ORAL at 13:02

## 2018-09-09 RX ADMIN — SODIUM CHLORIDE 100 ML: 900 INJECTION, SOLUTION INTRAVENOUS at 03:20

## 2018-09-09 RX ADMIN — MAGNESIUM SULFATE HEPTAHYDRATE 1 G: 1 INJECTION, SOLUTION INTRAVENOUS at 10:22

## 2018-09-09 RX ADMIN — ACETAMINOPHEN 650 MG: 325 TABLET, FILM COATED ORAL at 03:09

## 2018-09-09 RX ADMIN — OXYCODONE HYDROCHLORIDE 10 MG: 5 TABLET ORAL at 11:39

## 2018-09-09 RX ADMIN — ACETAMINOPHEN 650 MG: 325 TABLET, FILM COATED ORAL at 18:45

## 2018-09-09 RX ADMIN — POTASSIUM CHLORIDE 10 MEQ: 200 INJECTION, SOLUTION INTRAVENOUS at 13:00

## 2018-09-09 RX ADMIN — MAGNESIUM SULFATE HEPTAHYDRATE 1 G: 1 INJECTION, SOLUTION INTRAVENOUS at 05:15

## 2018-09-09 RX ADMIN — SENNOSIDES AND DOCUSATE SODIUM 1 TABLET: 8.6; 5 TABLET ORAL at 18:45

## 2018-09-09 RX ADMIN — ACETAMINOPHEN 650 MG: 325 TABLET, FILM COATED ORAL at 23:18

## 2018-09-09 RX ADMIN — POTASSIUM CHLORIDE 40 MEQ: 750 TABLET, FILM COATED, EXTENDED RELEASE ORAL at 05:15

## 2018-09-09 RX ADMIN — PIPERACILLIN SODIUM,TAZOBACTAM SODIUM 3.38 G: 3; .375 INJECTION, POWDER, FOR SOLUTION INTRAVENOUS at 05:59

## 2018-09-09 RX ADMIN — POLYETHYLENE GLYCOL 3350 17 G: 17 POWDER, FOR SOLUTION ORAL at 08:24

## 2018-09-09 RX ADMIN — POTASSIUM CHLORIDE 10 MEQ: 200 INJECTION, SOLUTION INTRAVENOUS at 12:00

## 2018-09-09 RX ADMIN — OXYCODONE HYDROCHLORIDE 10 MG: 5 TABLET ORAL at 23:21

## 2018-09-09 RX ADMIN — GABAPENTIN 600 MG: 300 CAPSULE ORAL at 18:45

## 2018-09-09 RX ADMIN — SODIUM CHLORIDE 125 ML/HR: 900 INJECTION, SOLUTION INTRAVENOUS at 19:10

## 2018-09-09 RX ADMIN — PIPERACILLIN SODIUM,TAZOBACTAM SODIUM 3.38 G: 3; .375 INJECTION, POWDER, FOR SOLUTION INTRAVENOUS at 20:00

## 2018-09-09 RX ADMIN — POTASSIUM CHLORIDE 10 MEQ: 200 INJECTION, SOLUTION INTRAVENOUS at 09:06

## 2018-09-09 RX ADMIN — POLYETHYLENE GLYCOL 3350 17 G: 17 POWDER, FOR SOLUTION ORAL at 18:45

## 2018-09-09 RX ADMIN — POTASSIUM CHLORIDE 10 MEQ: 200 INJECTION, SOLUTION INTRAVENOUS at 11:39

## 2018-09-09 RX ADMIN — OXYCODONE HYDROCHLORIDE 10 MG: 5 TABLET ORAL at 19:57

## 2018-09-09 RX ADMIN — OXYCODONE HYDROCHLORIDE 10 MG: 5 TABLET ORAL at 15:38

## 2018-09-09 RX ADMIN — OXYCODONE HYDROCHLORIDE 10 MG: 5 TABLET ORAL at 03:08

## 2018-09-09 RX ADMIN — Medication 10 ML: at 13:27

## 2018-09-09 RX ADMIN — GABAPENTIN 600 MG: 300 CAPSULE ORAL at 08:24

## 2018-09-09 RX ADMIN — Medication 10 ML: at 05:59

## 2018-09-09 RX ADMIN — Medication 10 ML: at 05:58

## 2018-09-09 RX ADMIN — SENNOSIDES AND DOCUSATE SODIUM 1 TABLET: 8.6; 5 TABLET ORAL at 08:25

## 2018-09-09 RX ADMIN — SODIUM CHLORIDE 500 ML: 900 INJECTION, SOLUTION INTRAVENOUS at 01:45

## 2018-09-09 RX ADMIN — Medication 10 ML: at 20:03

## 2018-09-09 NOTE — PROGRESS NOTES
Problem: Falls - Risk of 
Goal: *Absence of Falls Document Ana Lilia Barrera Fall Risk and appropriate interventions in the flowsheet. Outcome: Progressing Towards Goal 
Fall Risk Interventions: 
Mobility Interventions: Assess mobility with egress test, Patient to call before getting OOB, OT consult for ADLs, Communicate number of staff needed for ambulation/transfer Mentation Interventions: Adequate sleep, hydration, pain control, Door open when patient unattended, Evaluate medications/consider consulting pharmacy, More frequent rounding Medication Interventions: Patient to call before getting OOB Elimination Interventions: Call light in reach Problem: Pressure Injury - Risk of 
Goal: *Prevention of pressure injury Document Glynn Scale and appropriate interventions in the flowsheet. Outcome: Progressing Towards Goal 
Pressure Injury Interventions: 
Sensory Interventions: Assess changes in LOC, Discuss PT/OT consult with provider Activity Interventions: PT/OT evaluation, Pressure redistribution bed/mattress(bed type) Mobility Interventions: Pressure redistribution bed/mattress (bed type), PT/OT evaluation Nutrition Interventions: Document food/fluid/supplement intake

## 2018-09-09 NOTE — PROGRESS NOTES
0140: TRANSFER - IN REPORT: 
[facility] Maicol Lopez  being received from 5W (unit) for change in patient condition(SVT with hypotension ) Report consisted of patients Situation, Background, Assessment and  
Recommendations(SBAR). Information from the following report(s) SBAR, Kardex, Intake/Output and MAR was reviewed with the receiving nurse. Opportunity for questions and clarification was provided. Assessment completed upon patients arrival to unit and care assumed. Primary Nurse Bree aPrson RN and Daniel Garcia RN performed a dual skin assessment on this patient Impairment noted- see wound doc flow sheet - midline surgical back inscision. Current Bed:  
Total Care (foam) Glynn score is 19 
 
0145: Patient assessed. SVT. Lopressor given. CTA on hold until vital signs are stable. 500 cc bolus infusing. 0155: Patient converted to NSR. BP stable. Hr in 80s-90s.  
0320: Patient to CT on monitor, oxygen, and RN x2.  
 
 
0730: Bedside and Verbal shift change report given to Janene Pierce (oncoming nurse) by Ania Kim  (offgoing nurse). Report included the following information SBAR, Kardex, Intake/Output, MAR and Recent Results.

## 2018-09-09 NOTE — CONSULTS
[place]: David Chaudhari  [phone]  Requesting/referring provider: Dr. Karen Penn  Reason for Consult: SVT    HPI: Elda Olivares, a 46y.o. year-old who presents for evaluation of onset SVT overnight, rate 170-180. Pt asymptomatic. Broke with 5mg IV lopressor. No chest pain, no dyspnea, pain is under control This am, hypotensive, had fever last night, started on antibiotics and sepsis protocol. No Hx SVT. K, mag low, will replete this am. PTA no palpitations, occasional mild dyspnea. Suboptimal diet, not an exerciser, but active, works as CNA. Assessment/Plan:  1. CVA hx, sounds like hypertensive, no residual deficits  2. Body mass index is 59.11 kg/(m^2). Morbid obesity work on diet control, weight loss  3. HTN- malignant, low now, restart bblocker when able by BP  4. SVT- Bblocker when able, replete lytes, echo pending  5. Fever, hypotension, septic workup underway  6. Low Plt    FHx + MD, PAD, CVA  Soc no tob no etoh  She  has a past medical history of Chronic pain; Hypertension; and Stroke [facility]) (2004). Cardiovascular ROS: no chest pain or dyspnea on exertion  Respiratory ROS: no cough, shortness of breath, or wheezing  Neurological ROS: no TIA or stroke symptoms  All other systems negative except as above. PE  Vitals:    09/09/18 0600 09/09/18 0700 09/09/18 0800 09/09/18 0812   BP: 110/49 90/46 (!) 79/50 97/74   Pulse: 93 96 96 96   Resp: 21 12 16 23   Temp:   98.4 °F (36.9 °C)    SpO2: 100% 100% 100% 100%   Weight:       Height:        Body mass index is 59.11 kg/(m^2).    General appearance - alert, well appearing, and in no distress, morbidly obese  Mental status - affect appropriate to mood  Eyes - sclera anicteric, moist mucous membranes  Neck - supple, no significant adenopathy  Lymphatics - no  lymphadenopathy  Chest - clear to auscultation, no wheezes, rales or rhonchi  Heart - normal rate, regular rhythm, normal S1, S2, no murmurs, rubs, clicks or gallops  Abdomen - obese, soft, nontender, nondistended, no masses or organomegaly  Back exam - full range of motion, no tenderness  Neurological - cranial nerves II through XII grossly intact, no focal deficit  Musculoskeletal - no muscular tenderness noted, normal strength  Extremities - peripheral pulses normal, no pedal edema  Skin - normal coloration  no rashes    Recent Labs:  No results found for: CHOL, CHOLX, CHLST, CHOLV, 118987, HDL, LDL, LDLC, DLDLP, TGLX, [place] [place], [facility], [facility]  Lab Results   Component Value Date/Time    Creatinine 0.64 09/09/2018 01:17 AM     Lab Results   Component Value Date/Time    BUN 5 (L) 09/09/2018 01:17 AM     Lab Results   Component Value Date/Time    Potassium 3.1 (L) 09/09/2018 01:17 AM     Lab Results   Component Value Date/Time    Hemoglobin A1c 5.4 08/15/2018 12:12 PM     Lab Results   Component Value Date/Time    HGB 8.7 (L) 09/09/2018 01:17 AM     Lab Results   Component Value Date/Time    PLATELET 496 87/74/6382 01:17 AM       Reviewed:  Past Medical History:   Diagnosis Date    Chronic pain     BACK    Hypertension     Stroke ([facility] [address] 2004    TIA (SPEECH--NO RESIDUAL)     History   Smoking Status    Never Smoker   Smokeless Tobacco    Never Used     History   Alcohol Use    Yes     Comment: SOCIALLY, MONTHLY     No Known Allergies    Current Facility-Administered Medications   Medication Dose Route Frequency    iopamidol (ISOVUE-370) 76 % injection 100 mL  100 mL IntraVENous RAD ONCE    potassium chloride 10 mEq in 50 ml IVPB  10 mEq IntraVENous Q1H    magnesium sulfate 1 g/100 ml IVPB (premix or compounded)  1 g IntraVENous ONCE    piperacillin-tazobactam (ZOSYN) 3.375 g in 0.9% sodium chloride (MBP/ADV) 100 mL  3.375 g IntraVENous Q8H    0.9% sodium chloride infusion  125 mL/hr IntraVENous CONTINUOUS    polyethylene glycol (MIRALAX) packet 17 g  17 g Oral BID    ascorbic acid (vitamin C) (VITAMIN C) tablet 500 mg  500 mg Oral DAILY    gabapentin (NEURONTIN) capsule 600 mg  600 mg Oral BID    sodium chloride (NS) flush 5-10 mL  5-10 mL IntraVENous Q8H    sodium chloride (NS) flush 5-10 mL  5-10 mL IntraVENous PRN    acetaminophen (TYLENOL) tablet 650 mg  650 mg Oral Q6H    oxyCODONE IR (ROXICODONE) tablet 5 mg  5 mg Oral Q3H PRN    oxyCODONE IR (ROXICODONE) tablet 10 mg  10 mg Oral Q3H PRN    naloxone (NARCAN) injection 0.4 mg  0.4 mg IntraVENous PRN    senna-docusate (PERICOLACE) 8.6-50 mg per tablet 1 Tab  1 Tab Oral BID    bisacodyl (DULCOLAX) suppository 10 mg  10 mg Rectal DAILY PRN    benzocaine-menthol (CEPACOL) lozenge 1 Lozenge  1 Lozenge Oral PRN    cyclobenzaprine (FLEXERIL) tablet 10 mg  10 mg Oral BID PRN       Brennen Perales MD  [facility] heart and Vascular [place]  [facility] [address] [facility] [address]  [address]

## 2018-09-09 NOTE — PROGRESS NOTES
Problem: Mobility Impaired (Adult and Pediatric) Goal: *Acute Goals and Plan of Care (Insert Text) Physical Therapy Goals Initiated 9/7/2018 1. Patient will move from supine to sit and sit to supine , scoot up and down and roll side to side in bed with independence within 4 days. 2. Patient will perform sit to stand with independence within 4 days. 3. Patient will ambulate with independence for 100 feet with the least restrictive device within 4 days. 4. Patient will ascend/descend 4 stairs with 1 handrail(s) with supervision/set-up within 4 days. 5. Patient will verbalize and demonstrate understanding of spinal precautions (No bending, lifting greater than 5 lbs, or twisting; log-roll technique; frequent repositioning as instructed) within 4 days. physical Therapy TREATMENT Patient: Ryan Villarreal (46 y.o. female) Date: 9/9/2018 Diagnosis: LUMBAR STENOSIS, LUMBAR DISC HERNIATION Spinal stenosis of lumbar region <principal problem not specified> Procedure(s) (LRB): 
L5-S1 RIGHT FACET CYST REMOVAL,  L5- S1 FUSION WITH MAZOR (REQ MAZOR X) (N/A) 3 Days Post-Op Precautions: Back Chart, physical therapy assessment, plan of care and goals were reviewed. ASSESSMENT: 
Pt agreeable to therapy. Pt required increase assistance with mobility. During gait pt reports feeling lightheaded and hot. Returned to room /45 once sitting symptoms improved. Pt's HR elevated 120's-130's BPM with mobility. Pt was able to maintain SpO2 97% on room air. Will continue to progress pt as able with desire to return home with HHPT. Progression toward goals: 
[x]      Improving appropriately and progressing toward goals 
[]      Improving slowly and progressing toward goals 
[]      Not making progress toward goals and plan of care will be adjusted PLAN: 
Patient continues to benefit from skilled intervention to address the above impairments. Continue treatment per established plan of care. Discharge Recommendations: To Be Determined vs  HHPT Further Equipment Recommendations for Discharge:  Received bariatric rolling walker SUBJECTIVE:  
Patient stated I was doing better yesterday.  The patient stated 3/3 back precautions. Reviewed all 3 with patient. OBJECTIVE DATA SUMMARY:  
Critical Behavior: 
Neurologic State: Alert Orientation Level: Oriented X4 Cognition: Appropriate decision making, Appropriate for age attention/concentration, Appropriate safety awareness, Follows commands Safety/Judgement: Awareness of environment, Fall prevention Functional Mobility Training: 
Bed Mobility: 
Log   
Supine to Sit: Moderate assistance;Assist x2 Sit to Supine: Minimum assistance;Assist x2 Brace donned with  maximal assistance Transfers: 
Sit to Stand: Minimum assistance;Assist x2 Stand to Sit: Contact guard assistance Balance: 
Sitting: Intact Standing: Impaired Standing - Static: Good Standing - Dynamic : Fair Ambulation/Gait Training: 
Distance (ft): 100 Feet (ft) Assistive Device: Brace/Splint;Gait belt;Walker, rolling (barriatric ) Ambulation - Level of Assistance: Contact guard assistance Gait Abnormalities: Antalgic;Decreased step clearance Base of Support: Widened Speed/Nallely: Pace decreased (<100 feet/min); Slow Step Length: Left shortened;Right shortened Stairs: Therapeutic Exercises:  
 
Pain: 
Pain Scale 1: Numeric (0 - 10) Pain Intensity 1: 5 Pain Location 1: Back Pain Orientation 1: Posterior; Lower Pain Description 1: Aching Pain Intervention(s) 1: Medication (see MAR) Activity Tolerance:  
Limited Please refer to the flowsheet for vital signs taken during this treatment. After treatment:  
[]  Patient left in no apparent distress sitting up in chair 
[x]  Patient left in no apparent distress in bed 
[x]  Call bell left within reach [x]  Nursing notified 
[]  Caregiver present []  Bed alarm activated COMMUNICATION/COLLABORATION:  
The patients plan of care was discussed with: Registered Nurse Helen Holman PTA Time Calculation: 20 mins

## 2018-09-09 NOTE — PROGRESS NOTES
POD #3 s/p lumbar fusion Events noted Appreciate medicine and cardiology assistance C/o low back pain when moving but not worse pain Afebrile /39 p 102 Lumbar: Dressing c/d/i NVI distally A/P 1. SVT resolved 2. Fever 3. Lumbar fusion Continue w/u for fever and monitoring cardiac rhythm per cards and medicine Patient may be OOB if OK with cardiology

## 2018-09-09 NOTE — PROGRESS NOTES
Was told in report that patient had been sick all eveing. Rechecked vital signs to find a pulse of 177. Called a rapid response . Labs drawn and EKG down and then pt was transferred to the CCU. Report given to the rapid response nurse when we got the patient to the CCU. Pt's  brought her belongings to the CCU.

## 2018-09-09 NOTE — CONSULTS
[address]    Fernandez Barnett  MR#: [id number]  : 1970  ACCOUNT #: [de-identified]   DATE OF SERVICE: 2018    PRIMARY CARE PHYSICIAN:  Kasia Hernandes MD    ATTENDING PHYSICIAN:  Brett Cordero MD    A consult is called by the attending physician. REASON FOR CONSULTATION:  Postop fever. SUBJECTIVE:  The patient is a 53-year-old female with past medical history of hypertension and chronic pain syndrome who was admitted to the hospital 2018 by attending physician for chronic pain due to spinal stenosis of the lumbar region. Patient had a L5-S1 right facet cyst removal and L5-S1 fusion with Mazor 2 days ago. According to report, the patient has not been afebrile today, temperature reported today is 101.3. Patient reports mild nonproductive cough on the day of surgery. Cough has not worsened. There is no report of dysuria, frequency or hematuria. Patient has headache that is stated to be a mild to moderate headache,                                      - has not worsened following surgery. There is no complaint of chest pain, chest tightness, shortness of breath, dyspnea on exertion, PND, orthopnea. Patient has no diaphoresis, wheezing or rhonchi. She denied palpitations, irregular heartbeat, anxiety or depression. The patient is on bilateral SCDs for DVT prophylaxis. She reported nausea yesterday and 2 days ago, but today she has no report of nausea, vomiting, abdominal pain, constipation or diarrhea. She also has no complaints of neck stiffness or confusion. The patient states that she is feeling fine apart from the fever and chills. There is no report of trauma or fall. The patient is not on any antibiotics. PAST MEDICAL HISTORY:  1.  Morbid obesity. 2.  Hypertension. 3.  Chronic pain syndrome due to spinal stenosis of the lumbar region. 4.  History of TIA.     PAST SURGICAL HISTORY:  L5-S1 right facet cyst removal and L5-S1 fusion shyam Gomez. MEDICATIONS:  Prior to admission ascorbic acid 500 mg daily, aspirin 81 mg daily, cholecalciferol 800 units daily, gabapentin 600 mg 2 times daily, Meloxicam 50 mg daily, telmisartan/hydrochlorothiazide (Micardis HCT) 80/12.5 one tablet daily. ALLERGIES:  NO KNOWN DRUG ALLERGIES. SOCIAL HISTORY:  The patient  denies tobacco, alcohol or recreational drug use. FAMILY HISTORY:  Reviewed, but noncontributory. REVIEW OF SYSTEMS:  More than 12 systems reviewed and the pertinent positives are in the history of present illness. All others are negative. PHYSICAL EXAMINATION:  GENERAL:  Patient seen lying in bed in no acute respiratory distress. VITAL SIGNS:  Blood pressure is 95/49, pulse 110, temperature 101.8, respirations 16, O2 sat 94%. HEAD, EYES, EARS, NOSE AND THROAT:  Atraumatic,  normocephalic. Anicteric. Not jaundiced. Extraocular muscles intact. Pupils bilaterally reactive, equal and round. NECK:  Supple, no JVD, no carotid bruit. HEART:  Sounds 1 and 2, regular rate and rhythm. No gallop, no friction, no rub. RESPIRATION:  No wheezing, no  rhonchi, no rales. ABDOMEN:  Soft, nontender. Bowel sounds normoactive. NEUROLOGIC:  Alert, oriented x4. No focal deficits. SKIN:  Warm, dry, normal skin color, normal skin  turgor. PSYCHIATRIC:  Normal mood, normal affect. EXTREMITIES:  No edema, no cyanosis. Patient is on bilateral SCDs. DIAGNOSTIC DATA:  WBC 17.4, hemoglobin 10.9, hematocrit 36.6, platelet 47, Sodium 364, potassium 3.9, chloride 108, carbon dioxide 23, glucose 140, BUN 9, creatinine 0.70, calcium 8.7. ASSESSMENT:  1. Fever postop day 2. Possible sepsis  2. Tachycardia due to fever. 3.  Low platelets. 4.  Morbid obesity. 5.  Hypertension. 6.  Chronic back pain due to spinal stenosis of the lumbar region, status post recent surgery. 7.  History of transient ischemic attack. PLAN:  1. Postoperative management as per attending physician.   2. Blood cultures. 3.  Urinalysis. 4.  Chest x-ray. 5.  IV fluids. 6.  CBC, CMP, lactic acid. 7.  We will start IV antibiotics including Zosyn. Plan to discontinue if no infection is found,  8. Extensive patient education was done by bedside. 9.  Pain control as per attending physician. 10.  DVT prophylaxis as per attending physician. 11.  Fall precautions. 12.  Skin care precaution. 13.  Monitor vital signs including blood pressure as per unit protocol. 14.  Appropriate home medications have been restarted. 15.  I discussed advance directives with the patient. Her spouse is her next of kin. CODE IS FULL CODE.  16.  Further management will depend on patient's clinical progress and further evaluation by attending physician, results of tests and recommendation by consulting hospitalist.  Consider hematology consult if thrombocytopenia is persistent. Thank you for involving the hospitalist team in taking care of this pleasant pt. We will follow with you.       Tracey Stephens MD       SGU / LN  D: 09/08/2018 21:33     T: 09/09/2018 03:07  JOB #: [id number]

## 2018-09-09 NOTE — PROGRESS NOTES
Hospitalist Progress Note Dereck Osborne MD 
Answering service: [phone] OR [phone] from in house phone Date of Service:  2018 NAME:  Clayton Ludwig :  1970 MRN:  [id number] Admission Summary:  
45 y/o female with h/o HTN, chronic pain syndrome,  admitted to the hospital 2018 for chronic pain due to spinal stenosis of the lumbar region. Patient had a L5-S1 right facet cyst removal and L5-S1 fusion on 18. Patient developed fever Tmax 101.3 and tachycardia on , started on antibiotics and sepsis protocol. Patient also found to have SVT, rate 170-180, which broke with 5mg IV Metoprolol. Patient transferred to CCU. Interval history / Subjective:  
  Patient seen and examined; states she feels better, still has back pain. No N/V, chest pain, palpitation. Assessment & Plan: #. Possible sepsis (leukocytosis, fever, tachycardia and hypotension) 
-unclear sourece, fever started on post OP day two. Due to leukocytosis and fever, started on IV abx/Zosyn for now. Follow up blood cx and deescalate abxs. -CTA chest - no PE, mild atelectasis in the right lower lobe. Encourage Incentive spirometer use. #. SVT: new;  broke with 5mg IV Metoprolol. 
-Cardiology following 
-Replete electrolytes, keep K>4 and Mg>2 
-TTE pending 
-When BP allow, will start low dose BB ( Metoprolol 12.5mg BID) #. HTN: currently BP on the lower sie. -Monitor #. Morbid obesity Body mass index is 59.11 kg/(m^2) - Counseling regarding diet, exercise and weight loss #. Chronic back pain due to spinal stenosis of the lumbar region, status post L5-S1 right facet cyst removal and L5-S1 fusion on 18. 
-Continue management per team 
  
 
Hospital Problems  Date Reviewed: 2018 Codes Class Noted POA Morbid obesity (HCC) (Chronic) ICD-10-CM: E66.01 
ICD-9-CM: 278.01  Unknown Yes Overview Signed 9/7/2018  8:14 AM by Romayne Kurtz, NP Body mass index is 55.61 kg/(m^2). Spinal stenosis of lumbar region ICD-10-CM: M48.061 
ICD-9-CM: 724.02  9/6/2018 Unknown Review of Systems: A comprehensive review of systems was negative except for that written in the HPI. Vital Signs:  
 Last 24hrs VS reviewed since prior progress note. Most recent are: 
Visit Vitals  /80  Pulse (!) 102  Temp 98.4 °F (36.9 °C)  Resp 19  
 Ht 5' 4\" (1.626 m)  Wt 156.2 kg (344 lb 5.7 oz)  SpO2 100%  BMI 59.11 kg/m2 Intake/Output Summary (Last 24 hours) at 09/09/18 1046 Last data filed at 09/09/18 1022 Gross per 24 hour Intake             2240 ml Output             1800 ml Net              440 ml Physical Examination:  
 
 
     
Constitutional:  No acute distress, cooperative, pleasant   
ENT:  Oral mucous moist, oropharynx benign. Neck supple, Resp:  CTA bilaterally. No wheezing/rhonchi/rales. No accessory muscle use CV:  Regular rhythm, normal rate, no murmurs, gallops, rubs GI:  Soft, non distended, non tender. normoactive bowel sounds, no hepatosplenomegaly Musculoskeletal: Surgical dressing on the lower back Neurologic:  Moves all extremities. AAOx3, CN II-XII reviewed Psych:  Good insight, Not anxious nor agitated. Data Review:  
 Review and/or order of clinical lab test 
Review and/or order of tests in the radiology section of CPT Labs:  
 
Recent Labs  
   09/09/18 0117 09/08/18 2202 WBC  15.6*  14.7* HGB  8.7*  8.1* HCT  27.5*  25.9*  
PLT  222  218 Recent Labs  
   09/09/18 0117 09/08/18 2202 09/07/18 
 0202 NA  141  141  139  
K  3.1*  3.2*  3.9 CL  106  106  108 CO2  29  27  23 BUN  5*  6  9 CREA  0.64  0.67  0.70 GLU  118*  130*  140* CA  8.3*  8.5  8.7 MG  1.8   --    --   
 
Recent Labs  
   09/09/18 0117 09/08/18 2202 SGOT  23  23 ALT  20  22 AP  65  60 TBILI  0.3  0.4 TP  6.1*  5.8* ALB  2.5*  2.3*  
GLOB  3.6  3.5 No results for input(s): INR, PTP, APTT in the last 72 hours. No lab exists for component: INREXT No results for input(s): FE, TIBC, PSAT, FERR in the last 72 hours. No results found for: FOL, RBCF No results for input(s): PH, PCO2, PO2 in the last 72 hours. No results for input(s): CPK, CKNDX, TROIQ in the last 72 hours. No lab exists for component: CPKMB No results found for: CHOL, CHOLX, CHLST, CHOLV, HDL, LDL, LDLC, DLDLP, TGLX, TRIGL, TRIGP, CHHD, CHHDX Lab Results Component Value Date/Time Glucose (POC) 119 (H) 09/09/2018 07:14 AM  
 Glucose (POC) 110 (H) 09/06/2018 06:56 AM  
 
Lab Results Component Value Date/Time Color YELLOW/STRAW 08/15/2018 10:17 AM  
 Appearance CLEAR 08/15/2018 10:17 AM  
 Specific gravity 1.021 08/15/2018 10:17 AM  
 pH (UA) 5.0 08/15/2018 10:17 AM  
 Protein NEGATIVE  08/15/2018 10:17 AM  
 Glucose NEGATIVE  08/15/2018 10:17 AM  
 Ketone NEGATIVE  08/15/2018 10:17 AM  
 Bilirubin NEGATIVE  08/15/2018 10:17 AM  
 Urobilinogen 0.2 08/15/2018 10:17 AM  
 Nitrites NEGATIVE  08/15/2018 10:17 AM  
 Leukocyte Esterase NEGATIVE  08/15/2018 10:17 AM  
 Epithelial cells FEW 08/15/2018 10:17 AM  
 Bacteria NEGATIVE  08/15/2018 10:17 AM  
 WBC 0-4 08/15/2018 10:17 AM  
 RBC 0-5 08/15/2018 10:17 AM  
 
 
 
Medications Reviewed:  
 
Current Facility-Administered Medications Medication Dose Route Frequency  iopamidol (ISOVUE-370) 76 % injection 100 mL  100 mL IntraVENous RAD ONCE  potassium chloride 10 mEq in 50 ml IVPB  10 mEq IntraVENous Q1H  
 magnesium sulfate 1 g/100 ml IVPB (premix or compounded)  1 g IntraVENous ONCE  piperacillin-tazobactam (ZOSYN) 3.375 g in 0.9% sodium chloride (MBP/ADV) 100 mL  3.375 g IntraVENous Q8H  
 0.9% sodium chloride infusion  125 mL/hr IntraVENous CONTINUOUS  
 polyethylene glycol (MIRALAX) packet 17 g  17 g Oral BID  
  ascorbic acid (vitamin C) (VITAMIN C) tablet 500 mg  500 mg Oral DAILY  gabapentin (NEURONTIN) capsule 600 mg  600 mg Oral BID  sodium chloride (NS) flush 5-10 mL  5-10 mL IntraVENous Q8H  
 sodium chloride (NS) flush 5-10 mL  5-10 mL IntraVENous PRN  
 acetaminophen (TYLENOL) tablet 650 mg  650 mg Oral Q6H  
 oxyCODONE IR (ROXICODONE) tablet 5 mg  5 mg Oral Q3H PRN  
 oxyCODONE IR (ROXICODONE) tablet 10 mg  10 mg Oral Q3H PRN  
 naloxone (NARCAN) injection 0.4 mg  0.4 mg IntraVENous PRN  
 senna-docusate (PERICOLACE) 8.6-50 mg per tablet 1 Tab  1 Tab Oral BID  
 bisacodyl (DULCOLAX) suppository 10 mg  10 mg Rectal DAILY PRN  
 benzocaine-menthol (CEPACOL) lozenge 1 Lozenge  1 Lozenge Oral PRN  
 cyclobenzaprine (FLEXERIL) tablet 10 mg  10 mg Oral BID PRN  
 
______________________________________________________________________ EXPECTED LENGTH OF STAY: 2d 21h ACTUAL LENGTH OF STAY:          3 David Fischer MD

## 2018-09-09 NOTE — PROGRESS NOTES
0800: Assumed care of patient from off going nurse Kaylen Berg. RN. 
6947: Held BP meds this morning, bp continues to be marginal.  
Patient in bed resting.  
0905: Dr. Sharon Gomez at bedside, updated on patient status. New orders received for electrolyte replacement. 1200: Bedside and Verbal shift change report given to Salena Handley (oncoming nurse) by Luis E Velazquez (offgoing nurse). Report included the following information SBAR.

## 2018-09-10 LAB
ABO + RH BLD: NORMAL
ANION GAP SERPL CALC-SCNC: 6 MMOL/L (ref 5–15)
BLOOD GROUP ANTIBODIES SERPL: NORMAL
BUN SERPL-MCNC: 6 MG/DL (ref 6–20)
BUN/CREAT SERPL: 9 (ref 12–20)
CALCIUM SERPL-MCNC: 8.2 MG/DL (ref 8.5–10.1)
CHLORIDE SERPL-SCNC: 106 MMOL/L (ref 97–108)
CO2 SERPL-SCNC: 26 MMOL/L (ref 21–32)
CREAT SERPL-MCNC: 0.64 MG/DL (ref 0.55–1.02)
ERYTHROCYTE [DISTWIDTH] IN BLOOD BY AUTOMATED COUNT: 13.9 % (ref 11.5–14.5)
GLUCOSE SERPL-MCNC: 109 MG/DL (ref 65–100)
HCT VFR BLD AUTO: 26.7 % (ref 35–47)
HGB BLD-MCNC: 8.1 G/DL (ref 11.5–16)
MAGNESIUM SERPL-MCNC: 2.2 MG/DL (ref 1.6–2.4)
MCH RBC QN AUTO: 30.5 PG (ref 26–34)
MCHC RBC AUTO-ENTMCNC: 30.3 G/DL (ref 30–36.5)
MCV RBC AUTO: 100.4 FL (ref 80–99)
NRBC # BLD: 0 K/UL (ref 0–0.01)
NRBC BLD-RTO: 0 PER 100 WBC
PLATELET # BLD AUTO: 261 K/UL (ref 150–400)
PMV BLD AUTO: 10.2 FL (ref 8.9–12.9)
POTASSIUM SERPL-SCNC: 3.7 MMOL/L (ref 3.5–5.1)
RBC # BLD AUTO: 2.66 M/UL (ref 3.8–5.2)
SODIUM SERPL-SCNC: 138 MMOL/L (ref 136–145)
SPECIMEN EXP DATE BLD: NORMAL
WBC # BLD AUTO: 11.4 K/UL (ref 3.6–11)

## 2018-09-10 PROCEDURE — 74011000258 HC RX REV CODE- 258: Performed by: INTERNAL MEDICINE

## 2018-09-10 PROCEDURE — 83735 ASSAY OF MAGNESIUM: CPT | Performed by: INTERNAL MEDICINE

## 2018-09-10 PROCEDURE — 74011250637 HC RX REV CODE- 250/637: Performed by: PHYSICIAN ASSISTANT

## 2018-09-10 PROCEDURE — 86850 RBC ANTIBODY SCREEN: CPT | Performed by: ORTHOPAEDIC SURGERY

## 2018-09-10 PROCEDURE — 74011250636 HC RX REV CODE- 250/636: Performed by: INTERNAL MEDICINE

## 2018-09-10 PROCEDURE — 85027 COMPLETE CBC AUTOMATED: CPT | Performed by: INTERNAL MEDICINE

## 2018-09-10 PROCEDURE — 97530 THERAPEUTIC ACTIVITIES: CPT

## 2018-09-10 PROCEDURE — 97535 SELF CARE MNGMENT TRAINING: CPT

## 2018-09-10 PROCEDURE — 74011000250 HC RX REV CODE- 250: Performed by: NURSE PRACTITIONER

## 2018-09-10 PROCEDURE — 65660000000 HC RM CCU STEPDOWN

## 2018-09-10 PROCEDURE — 36415 COLL VENOUS BLD VENIPUNCTURE: CPT | Performed by: INTERNAL MEDICINE

## 2018-09-10 PROCEDURE — 74011250637 HC RX REV CODE- 250/637: Performed by: ORTHOPAEDIC SURGERY

## 2018-09-10 PROCEDURE — 80048 BASIC METABOLIC PNL TOTAL CA: CPT | Performed by: INTERNAL MEDICINE

## 2018-09-10 PROCEDURE — 97116 GAIT TRAINING THERAPY: CPT

## 2018-09-10 RX ORDER — METOPROLOL TARTRATE 25 MG/1
25 TABLET, FILM COATED ORAL EVERY 12 HOURS
Status: DISCONTINUED | OUTPATIENT
Start: 2018-09-10 | End: 2018-09-12 | Stop reason: HOSPADM

## 2018-09-10 RX ADMIN — OXYCODONE HYDROCHLORIDE AND ACETAMINOPHEN 500 MG: 500 TABLET ORAL at 08:27

## 2018-09-10 RX ADMIN — PIPERACILLIN SODIUM,TAZOBACTAM SODIUM 3.38 G: 3; .375 INJECTION, POWDER, FOR SOLUTION INTRAVENOUS at 21:22

## 2018-09-10 RX ADMIN — Medication 10 ML: at 21:32

## 2018-09-10 RX ADMIN — POLYETHYLENE GLYCOL 3350 17 G: 17 POWDER, FOR SOLUTION ORAL at 17:15

## 2018-09-10 RX ADMIN — METOPROLOL TARTRATE 25 MG: 25 TABLET ORAL at 10:00

## 2018-09-10 RX ADMIN — ACETAMINOPHEN 650 MG: 325 TABLET, FILM COATED ORAL at 05:00

## 2018-09-10 RX ADMIN — ACETAMINOPHEN 650 MG: 325 TABLET, FILM COATED ORAL at 11:31

## 2018-09-10 RX ADMIN — POLYETHYLENE GLYCOL 3350 17 G: 17 POWDER, FOR SOLUTION ORAL at 08:27

## 2018-09-10 RX ADMIN — Medication 10 ML: at 05:00

## 2018-09-10 RX ADMIN — OXYCODONE HYDROCHLORIDE 10 MG: 5 TABLET ORAL at 13:05

## 2018-09-10 RX ADMIN — SENNOSIDES AND DOCUSATE SODIUM 1 TABLET: 8.6; 5 TABLET ORAL at 17:15

## 2018-09-10 RX ADMIN — OXYCODONE HYDROCHLORIDE 10 MG: 5 TABLET ORAL at 04:19

## 2018-09-10 RX ADMIN — OXYCODONE HYDROCHLORIDE 10 MG: 5 TABLET ORAL at 21:21

## 2018-09-10 RX ADMIN — ACETAMINOPHEN 650 MG: 325 TABLET, FILM COATED ORAL at 17:15

## 2018-09-10 RX ADMIN — CYCLOBENZAPRINE HYDROCHLORIDE 10 MG: 10 TABLET, FILM COATED ORAL at 21:28

## 2018-09-10 RX ADMIN — PIPERACILLIN SODIUM,TAZOBACTAM SODIUM 3.38 G: 3; .375 INJECTION, POWDER, FOR SOLUTION INTRAVENOUS at 05:00

## 2018-09-10 RX ADMIN — OXYCODONE HYDROCHLORIDE 10 MG: 5 TABLET ORAL at 17:16

## 2018-09-10 RX ADMIN — GABAPENTIN 600 MG: 300 CAPSULE ORAL at 08:27

## 2018-09-10 RX ADMIN — METOPROLOL TARTRATE 25 MG: 25 TABLET ORAL at 21:20

## 2018-09-10 RX ADMIN — ACETAMINOPHEN 650 MG: 325 TABLET, FILM COATED ORAL at 23:36

## 2018-09-10 RX ADMIN — PIPERACILLIN SODIUM,TAZOBACTAM SODIUM 3.38 G: 3; .375 INJECTION, POWDER, FOR SOLUTION INTRAVENOUS at 13:04

## 2018-09-10 RX ADMIN — GABAPENTIN 600 MG: 300 CAPSULE ORAL at 17:14

## 2018-09-10 RX ADMIN — SENNOSIDES AND DOCUSATE SODIUM 1 TABLET: 8.6; 5 TABLET ORAL at 08:27

## 2018-09-10 RX ADMIN — CYCLOBENZAPRINE HYDROCHLORIDE 10 MG: 10 TABLET, FILM COATED ORAL at 10:10

## 2018-09-10 RX ADMIN — OXYCODONE HYDROCHLORIDE 10 MG: 5 TABLET ORAL at 10:00

## 2018-09-10 NOTE — PROGRESS NOTES
1930 Bedside and Verbal shift change report given to Cat RN (oncoming nurse) by Ramírez Yates RN (offgoing nurse). Report included the following information SBAR.  
 
2300 Complete CHG bath given. Pt tolerated well. Voldi 77 drawn. 0730 Bedside and Verbal shift change report given to Vicky Juarez (oncoming nurse) by Cat RN (offgoing nurse). Report included the following information SBAR.

## 2018-09-10 NOTE — PROGRESS NOTES
[place] [place]: Dagmar Curtis 
[phone] Requesting/referring provider: Dr. Silvia Cano Reason for Consult: SVT 
 
HPI: Zain Fernandez, a 46y.o. year-old who presents for evaluation of onset SVT overnight, rate 170-180. Pt asymptomatic. Broke with 5mg IV lopressor. No chest pain, no dyspnea, pain is under control This am, hypotensive, had fever last night, started on antibiotics and sepsis protocol. No Hx SVT. K, mag low, will replete this am. PTA no palpitations, occasional mild dyspnea. Suboptimal diet, not an exerciser, but active, works as CNA. Subjective: 
Feeling better today. BP elevated and without SVT overnight. NSR on tele. Denies CP or SOB. UP in chair today. No chest pain, no dyspnea. Assessment/Plan: 1. CVA hx, sounds like hypertensive, no residual deficits 2. Body mass index is 61.79 kg/(m^2). Morbid obesity work on diet control, weight loss 3. HTN- malignant,probably has component of HFPEF  But compensated righ tnow - Elevated. Will restart BB to start with. 4. SVT - NSR overnight - Tachy - Bblocker -  
 - Echo EF 60%. [address] Mild MR. Atrial septum bows from left to right, consistent with increased left atrial pressure. - K 3.7, Mg 2.2 - recommend K at 4.0 and Mg at 2.0 5. Fever, hypotension, resolved. - Abx d/cing by Ortho 6. Low Hgb 
 - 8.1- Blood loss FHx + MD, PAD, CVA Soc no tob no etoh She  has a past medical history of Chronic pain; Hypertension; and Stroke [facility]) (2004). Cardiovascular ROS: no chest pain or dyspnea on exertion Respiratory ROS: no cough, shortness of breath, or wheezing Neurological ROS: no TIA or stroke symptoms All other systems negative except as above. PE 
Vitals:  
 09/10/18 2120 09/10/18 2300 09/11/18 0300 09/11/18 0700 BP: 114/68 127/73 121/77 105/68 Pulse: (!) 106 94 93 91 Resp:  12 18 20 Temp:  99.3 °F (37.4 °C) 98.6 °F (37 °C) 98.3 °F (36.8 °C) SpO2:  97% 99% 97% Weight:   (!) 360 lb (163.3 kg) Height:      
 Body mass index is 61.79 kg/(m^2). General appearance - alert, well appearing, and in no distress, morbidly obese Mental status - affect appropriate to mood Eyes - sclera anicteric, moist mucous membranes Chest - clear to auscultation, no wheezes, rales or rhonchi Heart - normal rate, regular rhythm, normal S1, S2, no murmurs, rubs, clicks or gallops Abdomen - obese, soft, nontender, nondistended, bowel sounds present Neurological - cranial nerves II through XII grossly intact, no focal deficit Extremities - peripheral pulses normal, no pedal edema Skin - normal coloration  no rashes Recent Labs: 
No results found for: CHOL, CHOLX, CHLST, CHOLV, 188935, HDL, LDL, LDLC, DLDLP, TGLX, TRIGL, TRIGP, CHHD, CHHDX Lab Results Component Value Date/Time Creatinine 0.64 09/10/2018 03:45 AM  
 
Lab Results Component Value Date/Time BUN 6 09/10/2018 03:45 AM  
 
Lab Results Component Value Date/Time Potassium 3.7 09/10/2018 03:45 AM  
 
Lab Results Component Value Date/Time Hemoglobin A1c 5.4 08/15/2018 12:12 PM  
 
Lab Results Component Value Date/Time HGB 8.1 (L) 09/10/2018 03:45 AM  
 
Lab Results Component Value Date/Time PLATELET 081 47/47/5090 03:45 AM  
 
 
Reviewed: 
Past Medical History:  
Diagnosis Date  Chronic pain BACK  Hypertension  Stroke [facility]) 2004 TIA (SPEECH--NO RESIDUAL) History Smoking Status  Never Smoker Smokeless Tobacco  
 Never Used History Alcohol Use  Yes Comment: SOCIALLY, MONTHLY No Known Allergies Current Facility-Administered Medications Medication Dose Route Frequency  metoprolol tartrate (LOPRESSOR) tablet 25 mg  25 mg Oral Q12H  piperacillin-tazobactam (ZOSYN) 3.375 g in 0.9% sodium chloride (MBP/ADV) 100 mL  3.375 g IntraVENous Q8H  
 polyethylene glycol (MIRALAX) packet 17 g  17 g Oral BID  ascorbic acid (vitamin C) (VITAMIN C) tablet 500 mg  500 mg Oral DAILY  gabapentin (NEURONTIN) capsule 600 mg  600 mg Oral BID  sodium chloride (NS) flush 5-10 mL  5-10 mL IntraVENous Q8H  
 sodium chloride (NS) flush 5-10 mL  5-10 mL IntraVENous PRN  
 acetaminophen (TYLENOL) tablet 650 mg  650 mg Oral Q6H  
 oxyCODONE IR (ROXICODONE) tablet 5 mg  5 mg Oral Q3H PRN  
 oxyCODONE IR (ROXICODONE) tablet 10 mg  10 mg Oral Q3H PRN  
 naloxone (NARCAN) injection 0.4 mg  0.4 mg IntraVENous PRN  
 senna-docusate (PERICOLACE) 8.6-50 mg per tablet 1 Tab  1 Tab Oral BID  
 bisacodyl (DULCOLAX) suppository 10 mg  10 mg Rectal DAILY PRN  
 benzocaine-menthol (CEPACOL) lozenge 1 Lozenge  1 Lozenge Oral PRN  
 cyclobenzaprine (FLEXERIL) tablet 10 mg  10 mg Oral BID PRN MD Luciano Streeter [facility] [place] [address]

## 2018-09-10 NOTE — PROGRESS NOTES
Problem: Falls - Risk of 
Goal: *Absence of Falls Document Wendy Green Fall Risk and appropriate interventions in the flowsheet. Fall Risk Interventions: 
Mobility Interventions: Patient to call before getting OOB, PT Consult for mobility concerns, PT Consult for assist device competence, OT consult for ADLs, Communicate number of staff needed for ambulation/transfer Mentation Interventions: Update white board, Reorient patient, Familiar objects from home, Door open when patient unattended, Adequate sleep, hydration, pain control, Family/sitter at bedside Medication Interventions: Patient to call before getting OOB, Teach patient to arise slowly, Evaluate medications/consider consulting pharmacy Elimination Interventions: Call light in reach, Toileting schedule/hourly rounds, Toilet paper/wipes in reach Problem: Pressure Injury - Risk of 
Goal: *Prevention of pressure injury Document Glynn Scale and appropriate interventions in the flowsheet. Outcome: Progressing Towards Goal 
Pressure Injury Interventions: 
Sensory Interventions: Assess changes in LOC, Discuss PT/OT consult with provider, Float heels, Keep linens dry and wrinkle-free Activity Interventions: PT/OT evaluation, Pressure redistribution bed/mattress(bed type), Increase time out of bed Mobility Interventions: Assess need for specialty bed, PT/OT evaluation, Pressure redistribution bed/mattress (bed type) Nutrition Interventions: Document food/fluid/supplement intake Friction and Shear Interventions: Minimize layers, Lift sheet, Apply protective barrier, creams and emollients

## 2018-09-10 NOTE — PROGRESS NOTES
MARIA ANTONIA noted that therapy is recommending Inpatient rehab for this pt. MARIA ANTONIA met with pt to discuss and to offer choice. She would like a referral to go to [facility]. CM sent a referral to [facility] via [employer]. Meli Griffiths

## 2018-09-10 NOTE — PROGRESS NOTES
0730: Report received from SAMUEL Masters.  
 
0830: Pt up to chair with assistance. BILL Ferguson, cardiology, at bedside. Orders received. 1435: TRANSFER - OUT REPORT: 
 
Verbal report given to Enrique Dawson RN (name) on Era Greek  being transferred to [facility] (unit) for routine progression of care Report consisted of patients Situation, Background, Assessment and  
Recommendations(SBAR). Information from the following report(s) SBAR, Kardex, OR Summary, Procedure Summary, Intake/Output, MAR, Recent Results and Cardiac Rhythm ST was reviewed with the receiving nurse. Lines:  
Peripheral IV 09/09/18 Right Wrist (Active) Site Assessment Clean, dry, & intact 9/10/2018 12:00 PM  
Phlebitis Assessment 0 9/10/2018 12:00 PM  
Infiltration Assessment 0 9/10/2018 12:00 PM  
Dressing Status Clean, dry, & intact 9/10/2018 12:00 PM  
Dressing Type Transparent 9/10/2018 12:00 PM  
Hub Color/Line Status Blue 9/10/2018 12:00 PM  
Action Taken Open ports on tubing capped 9/10/2018 12:00 PM  
Alcohol Cap Used Yes 9/10/2018 12:00 PM  
  
 
Opportunity for questions and clarification was provided. Patient transported with: 
 Monitor Registered Nurse

## 2018-09-10 NOTE — PROGRESS NOTES
Physical Therapy Note Chart reviewed and discussed with RN. Patient was asleep in sidelying upon arrival but woke up during introduction. Patient reportedly in pain and just received pain meds, requested to rest a little longer as she was already up with RN this morning PT reviewed BLTs and log roll technique and plans to follow up this afternoon to progress gait training as able and appropriate.  
 
Kasia Antunez PT, DPT

## 2018-09-10 NOTE — PROGRESS NOTES
Problem: Self Care Deficits Care Plan (Adult) Goal: *Acute Goals and Plan of Care (Insert Text) Occupational Therapy Goals Initiated 9/7/2018 1. Patient will perform lower body dressing with supervision/set-up using AE PRN within 7 days. 2.  Patient will perform toilet transfer with modified independence using most appropriate DME within 7 days. 3.  Patient will grooming at the sink with modified independence within 7 days. 4.  Patient will don/doff back brace at modified independence within 7 days. 5.  Patient will verbalize/demonstrate 3/3 back precautions during ADL tasks without cues within 7 days. Occupational Therapy TREATMENT Patient: Dillon Lopez (46 y.o. female) Date: 9/10/2018 Diagnosis: LUMBAR STENOSIS, LUMBAR DISC HERNIATION Spinal stenosis of lumbar region <principal problem not specified> Procedure(s) (LRB): 
L5-S1 RIGHT FACET CYST REMOVAL,  L5- S1 FUSION WITH MAZOR (REQ MAZOR X) (N/A) 4 Days Post-Op Precautions: Back No bending, no lifting greater than 5 lbs, no twisting, log-roll technique, repositioning every 20-30 min except when sleeping, quick draw brace when OOB Chart, occupational therapy assessment, plan of care, and goals were reviewed. ASSESSMENT: 
Patient received supine in bed, alert and agreeable to therapy. Able to recall 3/3 back precautions. Required re-instruction in logrolling, required mod-A for rolling and mod-Ax2 for sidelying to sit. Donned quick draw brace seated EOB with set-up. Performed sit-stand with min-Ax2 and ambulated using bariatric RW (see PT note). Patient unable to access distal LB with back precautions, receptive to instruction using AE for LB dressing. Practiced using dressing stick to doff socks and sock aide to don, with set-up and mod verbal cues. Next session: toileting, practice donning/ doffing underwear/ pants, grooming standing).   Patient remains below functional baseline, is highly motivated, participates well in therapy, and would benefit from inpatient rehab at discharge. Progression toward goals: 
[x]          Improving appropriately and progressing toward goals 
[]          Improving slowly and progressing toward goals 
[]          Not making progress toward goals and plan of care will be adjusted PLAN: 
Patient continues to benefit from skilled intervention to address the above impairments. Continue treatment per established plan of care. Discharge Recommendations:  Inpatient Rehab Further Equipment Recommendations for Discharge:  TBD SUBJECTIVE:  
Patient stated \"My pain is a little better now.  The patient stated 3/3 back precautions. Reviewed all 3 with patient. OBJECTIVE DATA SUMMARY:  
Cognitive/Behavioral Status: 
Neurologic State: Alert Orientation Level: Oriented X4 Cognition: Appropriate decision making, Appropriate for age attention/concentration, Appropriate safety awareness, Follows commands Safety/Judgement: Awareness of environment, Insight into deficits, Good awareness of safety precautions Functional Mobility and Transfers for ADLs: 
Bed Mobility: 
Rolling: Moderate assistance Supine to Sit: Moderate assistance;Assist x2 (instructed in logrolling) Transfers: 
Sit to Stand: Minimum assistance;Assist x2 Balance: 
Sitting: Intact Standing: Impaired Standing - Static: Good;Constant support (bariatric RW) Standing - Dynamic : Fair ADL Intervention and Instruction: Lower Body Dressing Assistance Socks: Supervision/set-up (doffed w/ dressing stick, donned with sock aide, verbal cues) Cognitive Retraining Safety/Judgement: Awareness of environment; Insight into deficits;Good awareness of safety precautions Dressing lower body: Patient instructed to don brace first and on the benefits to remain seated to don all clothing to increase independence with precautions and pain management. Pain: 
Pain Scale 1: Numeric (0 - 10) Pain Intensity 1: 0 
 Pain Location 1: Back Pain Orientation 1: Lower Pain Description 1: Aching Pain Intervention(s) 1: Medication (see MAR) Activity Tolerance:  
Vitals:  
 09/10/18 1400 BP: (!) 122/91 Pulse: (!) 105 Resp: 19 SpO2: 97% Please refer to the flowsheet for vital signs taken during this treatment. After treatment:  
[x] Patient left in no apparent distress sitting up in chair 
[] Patient left in no apparent distress in bed 
[x] Call bell left within reach [x] Nursing notified 
[] Caregiver present 
[] Bed alarm activated COMMUNICATION/COLLABORATION:  
The patients plan of care was discussed with: Physical Therapist and Registered Nurse Juju Mccarthy OT Time Calculation: 33 mins

## 2018-09-10 NOTE — ROUTINE PROCESS
TRANSFER - IN REPORT: 
 
Verbal report received from RN (name) on Gunnar Raines  being received from CCU (unit) for routine progression of care Report consisted of patients Situation, Background, Assessment and  
Recommendations(SBAR). Information from the following report(s) SBAR, Kardex, ED Summary, OR Summary, Procedure Summary, Intake/Output, MAR, Accordion, Med Rec Status and Cardiac Rhythm NSR. was reviewed with the receiving nurse. Opportunity for questions and clarification was provided. Assessment completed upon patients arrival to unit and care assumed.

## 2018-09-10 NOTE — PROGRESS NOTES
Orthopedic Spine Progress Note Post Op day: 4 Days Post-Op September 10, 2018 8:12 AM  
 
Zain Fernandez Vital Signs:   
Patient Vitals for the past 8 hrs: 
 BP Temp Pulse Resp SpO2 Weight  
09/10/18 0700 151/65 - (!) 102 23 99 % -  
09/10/18 0600 154/71 - 97 20 100 % -  
09/10/18 0500 137/84 - 93 19 100 % -  
09/10/18 0400 (!) 144/108 98.2 °F (36.8 °C) 94 - 98 % 157 kg (346 lb 2 oz) 09/10/18 0300 142/73 - 92 16 97 % -  
09/10/18 0200 140/75 - 95 20 98 % -  
09/10/18 0100 133/84 - 95 17 96 % - Temp (24hrs), Av.3 °F (37.4 °C), Min:98.2 °F (36.8 °C), Max:101.4 °F (38.6 °C) Intake/Output: 
  
 190 - 09/10 0700 In: 3800.4 [P.O.:240; I.V.:3560.4] Out: 1800 [Urine:1800] Pain Control:  
Pain Assessment Pain Scale 1: Numeric (0 - 10) Pain Intensity 1: 0 Pain Onset 1: post op Pain Location 1: Leg, Back Pain Orientation 1: Left Pain Description 1: Aching Pain Intervention(s) 1: Medication (see MAR) LAB:   
Recent Labs  
   09/10/18 
 0345 HCT  26.7* HGB  8.1* Lab Results Component Value Date/Time Sodium 138 09/10/2018 03:45 AM  
 Potassium 3.7 09/10/2018 03:45 AM  
 Chloride 106 09/10/2018 03:45 AM  
 CO2 26 09/10/2018 03:45 AM  
 Glucose 109 (H) 09/10/2018 03:45 AM  
 BUN 6 09/10/2018 03:45 AM  
 Creatinine 0.64 09/10/2018 03:45 AM  
 Calcium 8.2 (L) 09/10/2018 03:45 AM  
 
 
Subjective:  Zain Fernandez is a 46 y.o. female s/p a  Procedure(s): 
L5-S1 RIGHT FACET CYST REMOVAL,  L5- S1 FUSION WITH MAZOR (REQ MAZOR X) Procedure(s): 
L5-S1 RIGHT FACET CYST REMOVAL,  L5- S1 FUSION WITH MAZOR (REQ MAZOR X). Tolerating diet. Objective: General: alert, cooperative, no distress. Gastrointestinal:  Soft, non-tender. Neurological: Neurovascular exam within normal limits. Sensation stable. Motor: unchanged C5-T1 and L2-S1. Mild right leg discomfort Musculoskeletal:  Cliff's sign negative in bilateral lower extremities. Calves soft, supple, non-tender upon palpation or with passive stretch. Skin: Incision - clean, dry and intact. No significant erythema or swelling. Dressing: clean, dry, and intact PT/OT:  
Gait:  Gait Base of Support: Widened Speed/Nallely: Pace decreased (<100 feet/min), Slow Step Length: Left shortened, Right shortened Gait Abnormalities: Antalgic, Decreased step clearance Ambulation - Level of Assistance: Contact guard assistance Distance (ft): 100 Feet (ft) Assistive Device: Brace/Splint, Gait belt, Walker, rolling (barriatric ) Rail Use: Right  (both hands on right ) Stairs - Level of Assistance: Assist X1, Moderate assistance Number of Stairs Trained: 2 Assessment:  
 s/p Procedure(s): 
L5-S1 RIGHT FACET CYST REMOVAL,  L5- S1 FUSION WITH MAZOR (REQ MAZOR X) Active Problems: 
  Spinal stenosis of lumbar region (9/6/2018) Morbid obesity ([address] () Overview: Body mass index is 55.61 kg/(m^2). Plan: 1. Continue PT/OT 2. Continue established methods of pain control 3. VTE Prophylaxes - TEDS &/or SCDs 4. D/c antibx 5. Back to 5w Discharge To:    
 
Signed By: Noelle Fox MD

## 2018-09-10 NOTE — PROGRESS NOTES
Hospitalist Progress Note Arnold Walker MD 
Answering service: [phone] OR [phone] from in house phone Date of Service:  9/10/2018 NAME:  Elda Olivares :  1970 MRN:  [id number] Admission Summary:  
45 y/o female with h/o HTN, chronic pain syndrome,  admitted to the hospital 2018 for chronic pain due to spinal stenosis of the lumbar region. Patient had a L5-S1 right facet cyst removal and L5-S1 fusion on 18. Patient developed fever Tmax 101.3 and tachycardia on , started on antibiotics and sepsis protocol. Patient also found to have SVT, rate 170-180, which broke with 5mg IV Metoprolol. Patient transferred to CCU. Interval history / Subjective:  
Patient seen and examined; states she feels better, T max- 99- 101 last 24 hrs Assessment & Plan: #. Possible sepsis (leukocytosis, fever, tachycardia and hypotension) 
-unclear sourece, fever started on post OP day two. Due to leukocytosis and fever, started on IV abx/Zosyn for now. Follow up blood cx and deescalate abxs. -CTA chest - no PE, mild atelectasis in the right lower lobe. Encourage Incentive spirometer use. - UA neg  
- culture NGTD 23 hrs #. SVT: new;  broke with 5mg IV Metoprolol. 
-Cardiology following 
-Replete electrolytes, keep K>4 and Mg>2 
-TTE pending 
-When BP allow, will start low dose BB ( Metoprolol 12.5mg BID) #. HTN: currently BP on the lower sie. -Monitor #. Morbid obesity Body mass index is 59.11 kg/(m^2) - Counseling regarding diet, exercise and  weight loss #. Chronic back pain due to spinal stenosis of the lumbar region, status post L5-S1 right facet cyst removal and L5-S1 fusion on 18. 
-Continue management per team 
  
 
Hospital Problems  Date Reviewed: 2018 Codes Class Noted POA Morbid obesity (HCC) (Chronic) ICD-10-CM: E66.01 
ICD-9-CM: 278.01  Unknown Yes Overview Signed 9/7/2018  8:14 AM by Eulis Eisenmenger, NP Body mass index is 55.61 kg/(m^2). Spinal stenosis of lumbar region ICD-10-CM: M48.061 
ICD-9-CM: 724.02  9/6/2018 Unknown Review of Systems: A comprehensive review of systems was negative except for that written in the HPI. Vital Signs:  
 Last 24hrs VS reviewed since prior progress note. Most recent are: 
Visit Vitals  /67  Pulse (!) 102  Temp 98.7 °F (37.1 °C)  Resp 25  
 Ht 5' 4\" (1.626 m)  Wt 157 kg (346 lb 2 oz)  SpO2 98%  BMI 59.41 kg/m2 Intake/Output Summary (Last 24 hours) at 09/10/18 1113 Last data filed at 09/10/18 0800 Gross per 24 hour Intake          1560.42 ml Output              450 ml Net          1110.42 ml Physical Examination:  
 
 
     
Constitutional:  No acute distress, cooperative, pleasant   
ENT:  Oral mucous moist, oropharynx benign. Neck supple, Resp:  CTA bilaterally. No wheezing/rhonchi/rales. No accessory muscle use CV:  Regular rhythm, normal rate, no murmurs, gallops, rubs GI:  Soft, non distended, non tender. normoactive bowel sounds, no hepatosplenomegaly Musculoskeletal: Surgical dressing on the lower back Neurologic:  Moves all extremities. AAOx3, CN II-XII reviewed Psych:  Good insight, Not anxious nor agitated. Data Review:  
 Review and/or order of clinical lab test 
Review and/or order of tests in the radiology section of CPT Labs:  
 
Recent Labs  
   09/10/18 
 0345  09/09/18 
 0117 WBC  11.4*  15.6* HGB  8.1*  8.7* HCT  26.7*  27.5*  
PLT  261  222 Recent Labs  
   09/10/18 
 0345  09/09/18 
 0117  09/08/18 
 2202 NA  138  141  141  
K  3.7  3.1*  3.2*  
CL  106  106  106 CO2  26  29  27 BUN  6  5*  6  
CREA  0.64  0.64  0.67 GLU  109*  118*  130* CA  8.2*  8.3*  8.5 MG  2.2  1.8   --   
 
Recent Labs  
   09/09/18 0117 09/08/18 
 2202 SGOT  23  23 ALT  20  22 AP  65  60 TBILI  0.3  0.4 TP  6.1*  5.8* ALB  2.5*  2.3*  
GLOB  3.6  3.5 No results for input(s): INR, PTP, APTT in the last 72 hours. No lab exists for component: INREXT, INREXT No results for input(s): FE, TIBC, PSAT, FERR in the last 72 hours. No results found for: FOL, RBCF No results for input(s): PH, PCO2, PO2 in the last 72 hours. No results for input(s): CPK, CKNDX, TROIQ in the last 72 hours. No lab exists for component: CPKMB No results found for: CHOL, CHOLX, CHLST, CHOLV, HDL, LDL, LDLC, DLDLP, TGLX, TRIGL, TRIGP, CHHD, CHHDX Lab Results Component Value Date/Time Glucose (POC) 107 (H) 09/09/2018 12:19 PM  
 Glucose (POC) 119 (H) 09/09/2018 07:14 AM  
 Glucose (POC) 110 (H) 09/06/2018 06:56 AM  
 
Lab Results Component Value Date/Time Color YELLOW/STRAW 09/09/2018 07:49 PM  
 Appearance CLOUDY (A) 09/09/2018 07:49 PM  
 Specific gravity 1.020 09/09/2018 07:49 PM  
 pH (UA) 6.5 09/09/2018 07:49 PM  
 Protein TRACE (A) 09/09/2018 07:49 PM  
 Glucose NEGATIVE  09/09/2018 07:49 PM  
 Ketone NEGATIVE  09/09/2018 07:49 PM  
 Bilirubin NEGATIVE  09/09/2018 07:49 PM  
 Urobilinogen 0.2 09/09/2018 07:49 PM  
 Nitrites NEGATIVE  09/09/2018 07:49 PM  
 Leukocyte Esterase NEGATIVE  09/09/2018 07:49 PM  
 Epithelial cells MODERATE (A) 09/09/2018 07:49 PM  
 Bacteria NEGATIVE  09/09/2018 07:49 PM  
 WBC 0-4 09/09/2018 07:49 PM  
 RBC 0-5 09/09/2018 07:49 PM  
 
 
 
Medications Reviewed:  
 
Current Facility-Administered Medications Medication Dose Route Frequency  metoprolol tartrate (LOPRESSOR) tablet 25 mg  25 mg Oral Q12H  piperacillin-tazobactam (ZOSYN) 3.375 g in 0.9% sodium chloride (MBP/ADV) 100 mL  3.375 g IntraVENous Q8H  
 polyethylene glycol (MIRALAX) packet 17 g  17 g Oral BID  ascorbic acid (vitamin C) (VITAMIN C) tablet 500 mg  500 mg Oral DAILY  gabapentin (NEURONTIN) capsule 600 mg  600 mg Oral BID  
  sodium chloride (NS) flush 5-10 mL  5-10 mL IntraVENous Q8H  
 sodium chloride (NS) flush 5-10 mL  5-10 mL IntraVENous PRN  
 acetaminophen (TYLENOL) tablet 650 mg  650 mg Oral Q6H  
 oxyCODONE IR (ROXICODONE) tablet 5 mg  5 mg Oral Q3H PRN  
 oxyCODONE IR (ROXICODONE) tablet 10 mg  10 mg Oral Q3H PRN  
 naloxone (NARCAN) injection 0.4 mg  0.4 mg IntraVENous PRN  
 senna-docusate (PERICOLACE) 8.6-50 mg per tablet 1 Tab  1 Tab Oral BID  
 bisacodyl (DULCOLAX) suppository 10 mg  10 mg Rectal DAILY PRN  
 benzocaine-menthol (CEPACOL) lozenge 1 Lozenge  1 Lozenge Oral PRN  
 cyclobenzaprine (FLEXERIL) tablet 10 mg  10 mg Oral BID PRN  
 
______________________________________________________________________ EXPECTED LENGTH OF STAY: 2d 21h ACTUAL LENGTH OF STAY:          4 Eliseo Thornton MD

## 2018-09-10 NOTE — INTERDISCIPLINARY ROUNDS
IDR/SLIDR Summary Patient: Clayton Ludwig MRN: [id number]    Age: 46 y.o. YOB: 1970 Room/Bed: [address] [place] [address] Admit Diagnosis: LUMBAR STENOSIS, LUMBAR DISC HERNIATION Spinal stenosis of lumbar region  Principal Diagnosis: <principal problem not specified>  
Goals: pain control, monitor HR/rhythm Readmission: NO  Quality Measure: SCIP 
VTE Prophylaxis: Mechanical 
Influenza Vaccine screening completed? YES Pneumococcal Vaccine screening completed? YES Mobility needs: Yes   Nutrition plan:Yes 
Consults: P. T and O.T. Financial concerns:Yes  Escalated to CM? YES 
RRAT Score: 21   Interventions:H2H Testing due for pt today? YES 
LOS: 3 days Expected length of stay ? days Discharge plan: ? PCP: Tyson Coley MD 
Transportation needs: Yes Days before discharge:two or more days before discharge Discharge disposition: Home Signed:  
 
Janessa Okeefe RN 
9/9/2018 
8:54 PM

## 2018-09-10 NOTE — PROGRESS NOTES
Problem: Mobility Impaired (Adult and Pediatric) Goal: *Acute Goals and Plan of Care (Insert Text) Physical Therapy Goals Initiated 9/7/2018 1. Patient will move from supine to sit and sit to supine , scoot up and down and roll side to side in bed with independence within 4 days. 2. Patient will perform sit to stand with independence within 4 days. 3. Patient will ambulate with independence for 100 feet with the least restrictive device within 4 days. 4. Patient will ascend/descend 4 stairs with 1 handrail(s) with supervision/set-up within 4 days. 5. Patient will verbalize and demonstrate understanding of spinal precautions (No bending, lifting greater than 5 lbs, or twisting; log-roll technique; frequent repositioning as instructed) within 4 days. physical Therapy TREATMENT Patient: Bina Beatty (46 y.o. female) Date: 9/10/2018 Precautions: Back Chart, physical therapy assessment, plan of care and goals were reviewed. ASSESSMENT: 
Chart reviewed, RN cleared patient for mobility, and patient received in bed with OT for anticipated need for second skilled clinician and safety. Patient continues to require heavy Mod A x 1 and additional time for bed mobility, CGA x 1-2 for transfers (line management > stability), and CGA/SBA for ambulating community distances. Pain limited session, and patient took one standing rest break when ambulating. PT reviewed LRT and mechanics for bed mobility and transfers, patient verbally acknowledged understanding. Patient ended session in chair with OT to continue services. Discharge rec - IP rehab as patient requires significant physical assistance to complete bed mobility and would be unsafe in emergency situations. Patient is highly motivated and can tolerate 3 hrs of skilled interventions from >1 discipline. RN aware. Progression toward goals: 
[x]      Improving appropriately and progressing toward goals 
[]      Improving slowly and progressing toward goals []      Not making progress toward goals and plan of care will be adjusted PLAN: 
Patient continues to benefit from skilled intervention to address the above impairments. Continue treatment per established plan of care. Discharge Recommendations:  Inpatient Rehab Further Equipment Recommendations for Discharge:  TBD SUBJECTIVE:  
Patient stated No Im okay I just needed a little rest breaks that's all.  The patient stated 3/3 back precautions. Reviewed all 3 with patient. OBJECTIVE DATA SUMMARY:  
Functional Mobility Training: 
Bed Mobility: 
Log Rolling: Moderate assistance Supine to Sit: Moderate assistance;Assist x2 (instructed in logrolling) Brace donned with  supervision/set-up Transfers: 
Sit to Stand: Minimum assistance;Assist x2 Stand to Sit: Contact guard assistance Ambulation/Gait Training: 
Distance (ft): 200 Feet (ft) Assistive Device: Gait belt;Walker, rolling;Brace/Splint Ambulation - Level of Assistance: Contact guard assistance;Stand-by assistance Gait Abnormalities: Antalgic;Decreased step clearance;Trunk sway increased; Path deviations (all decreased with distance) Base of Support: Widened Speed/Nallely: Slow;Pace decreased (<100 feet/min) (improved with distance) Step Length: Right shortened;Left shortened Stairs: Therapeutic Exercises:  
 
Pain: 
Pain Scale 1: Numeric (0 - 10) Pain Intensity 1: 0 Pain Location 1: Back Pain Orientation 1: Lower Pain Description 1: Aching Pain Intervention(s) 1: Medication (see MAR) Activity Tolerance:  
Good, progressed and improved throughout session Please refer to the flowsheet for vital signs taken during this treatment. After treatment:  
[]  Patient left in no apparent distress sitting up in chair 
[x]  Patient left in no apparent distress in bed 
[x]  Call bell left within reach [x]  Nursing notified 
[]  Caregiver present []  Bed alarm activated COMMUNICATION/COLLABORATION:  
The patients plan of care was discussed with: Occupational Therapist and Registered Nurse Yulisa Maxwell, PT, DPT Time Calculation: 24 mins

## 2018-09-10 NOTE — PROGRESS NOTES
Occupational Therapy: defer Chart reviewed and discussed with physical therapist. Per physical therapist, patient declining activity despite encouragement at this time due to pain and is requesting to rest.  Will defer and will f/u as able and appropriate.   
 
Savana Mccain, OTR/L

## 2018-09-10 NOTE — PROGRESS NOTES
Problem: Falls - Risk of 
Goal: *Absence of Falls Document Justin Selby Fall Risk and appropriate interventions in the flowsheet. Outcome: Progressing Towards Goal 
Fall Risk Interventions: 
Mobility Interventions: Assess mobility with egress test, Communicate number of staff needed for ambulation/transfer, Strengthening exercises (ROM-active/passive) Mentation Interventions: Adequate sleep, hydration, pain control, Door open when patient unattended, Evaluate medications/consider consulting pharmacy, Increase mobility, More frequent rounding, Room close to nurse's station, Reorient patient Medication Interventions: Assess postural VS orthostatic hypotension, Evaluate medications/consider consulting pharmacy, Teach patient to arise slowly, Patient to call before getting OOB Elimination Interventions: Call light in reach, Toilet paper/wipes in reach, Toileting schedule/hourly rounds, Patient to call for help with toileting needs Problem: Pressure Injury - Risk of 
Goal: *Prevention of pressure injury Document Glynn Scale and appropriate interventions in the flowsheet. Outcome: Progressing Towards Goal 
Pressure Injury Interventions: 
Sensory Interventions: Assess changes in LOC, Keep linens dry and wrinkle-free, Maintain/enhance activity level, Minimize linen layers, Use 30-degree side-lying position Activity Interventions: Increase time out of bed, PT/OT evaluation, Pressure redistribution bed/mattress(bed type), Assess need for specialty bed Mobility Interventions: Assess need for specialty bed, Pressure redistribution bed/mattress (bed type) Nutrition Interventions: Document food/fluid/supplement intake Friction and Shear Interventions: HOB 30 degrees or less, Lift sheet

## 2018-09-11 LAB
ANION GAP SERPL CALC-SCNC: 9 MMOL/L (ref 5–15)
BUN SERPL-MCNC: 6 MG/DL (ref 6–20)
BUN/CREAT SERPL: 9 (ref 12–20)
CALCIUM SERPL-MCNC: 8.9 MG/DL (ref 8.5–10.1)
CHLORIDE SERPL-SCNC: 104 MMOL/L (ref 97–108)
CO2 SERPL-SCNC: 28 MMOL/L (ref 21–32)
CREAT SERPL-MCNC: 0.69 MG/DL (ref 0.55–1.02)
GLUCOSE SERPL-MCNC: 90 MG/DL (ref 65–100)
MAGNESIUM SERPL-MCNC: 2.2 MG/DL (ref 1.6–2.4)
POTASSIUM SERPL-SCNC: 3.6 MMOL/L (ref 3.5–5.1)
SODIUM SERPL-SCNC: 141 MMOL/L (ref 136–145)

## 2018-09-11 PROCEDURE — 74011000258 HC RX REV CODE- 258: Performed by: INTERNAL MEDICINE

## 2018-09-11 PROCEDURE — 74011250636 HC RX REV CODE- 250/636: Performed by: INTERNAL MEDICINE

## 2018-09-11 PROCEDURE — 74011250637 HC RX REV CODE- 250/637: Performed by: ORTHOPAEDIC SURGERY

## 2018-09-11 PROCEDURE — 97116 GAIT TRAINING THERAPY: CPT

## 2018-09-11 PROCEDURE — 74011250637 HC RX REV CODE- 250/637: Performed by: PHYSICIAN ASSISTANT

## 2018-09-11 PROCEDURE — 74011250637 HC RX REV CODE- 250/637: Performed by: NURSE PRACTITIONER

## 2018-09-11 PROCEDURE — 36415 COLL VENOUS BLD VENIPUNCTURE: CPT | Performed by: NURSE PRACTITIONER

## 2018-09-11 PROCEDURE — 83735 ASSAY OF MAGNESIUM: CPT | Performed by: NURSE PRACTITIONER

## 2018-09-11 PROCEDURE — 74011000250 HC RX REV CODE- 250: Performed by: NURSE PRACTITIONER

## 2018-09-11 PROCEDURE — 94760 N-INVAS EAR/PLS OXIMETRY 1: CPT

## 2018-09-11 PROCEDURE — 65660000000 HC RM CCU STEPDOWN

## 2018-09-11 PROCEDURE — 80048 BASIC METABOLIC PNL TOTAL CA: CPT | Performed by: NURSE PRACTITIONER

## 2018-09-11 PROCEDURE — 97530 THERAPEUTIC ACTIVITIES: CPT

## 2018-09-11 RX ORDER — METHOCARBAMOL 500 MG/1
500 TABLET, FILM COATED ORAL 2 TIMES DAILY
Status: DISCONTINUED | OUTPATIENT
Start: 2018-09-11 | End: 2018-09-12

## 2018-09-11 RX ORDER — METOPROLOL TARTRATE 25 MG/1
25 TABLET, FILM COATED ORAL EVERY 12 HOURS
Qty: 60 TAB | Refills: 0 | Status: SHIPPED | OUTPATIENT
Start: 2018-09-11

## 2018-09-11 RX ORDER — NYSTATIN 100000 [USP'U]/ML
500000 SUSPENSION ORAL 4 TIMES DAILY
Status: DISCONTINUED | OUTPATIENT
Start: 2018-09-11 | End: 2018-09-12 | Stop reason: HOSPADM

## 2018-09-11 RX ORDER — SORBITOL SOLUTION 70 %
30 SOLUTION, ORAL MISCELLANEOUS DAILY PRN
Status: DISCONTINUED | OUTPATIENT
Start: 2018-09-11 | End: 2018-09-12 | Stop reason: HOSPADM

## 2018-09-11 RX ORDER — SORBITOL SOLUTION 70 %
30 SOLUTION, ORAL MISCELLANEOUS DAILY PRN
Status: DISCONTINUED | OUTPATIENT
Start: 2018-09-11 | End: 2018-09-11

## 2018-09-11 RX ORDER — SORBITOL SOLUTION 70 %
30 SOLUTION, ORAL MISCELLANEOUS ONCE
Status: COMPLETED | OUTPATIENT
Start: 2018-09-11 | End: 2018-09-11

## 2018-09-11 RX ADMIN — NYSTATIN 500000 UNITS: 100000 SUSPENSION ORAL at 17:26

## 2018-09-11 RX ADMIN — OXYCODONE HYDROCHLORIDE 5 MG: 5 TABLET ORAL at 17:26

## 2018-09-11 RX ADMIN — OXYCODONE HYDROCHLORIDE AND ACETAMINOPHEN 500 MG: 500 TABLET ORAL at 09:20

## 2018-09-11 RX ADMIN — METHOCARBAMOL 500 MG: 500 TABLET ORAL at 15:00

## 2018-09-11 RX ADMIN — SORBITOL 30 ML: 258.2 SOLUTION ORAL at 12:00

## 2018-09-11 RX ADMIN — POLYETHYLENE GLYCOL 3350 17 G: 17 POWDER, FOR SOLUTION ORAL at 09:13

## 2018-09-11 RX ADMIN — ACETAMINOPHEN 650 MG: 325 TABLET, FILM COATED ORAL at 06:08

## 2018-09-11 RX ADMIN — METOPROLOL TARTRATE 25 MG: 25 TABLET ORAL at 09:14

## 2018-09-11 RX ADMIN — Medication 10 ML: at 15:00

## 2018-09-11 RX ADMIN — ACETAMINOPHEN 650 MG: 325 TABLET, FILM COATED ORAL at 17:26

## 2018-09-11 RX ADMIN — SENNOSIDES AND DOCUSATE SODIUM 1 TABLET: 8.6; 5 TABLET ORAL at 09:14

## 2018-09-11 RX ADMIN — ACETAMINOPHEN 650 MG: 325 TABLET, FILM COATED ORAL at 12:00

## 2018-09-11 RX ADMIN — METOPROLOL TARTRATE 25 MG: 25 TABLET ORAL at 21:14

## 2018-09-11 RX ADMIN — NYSTATIN 500000 UNITS: 100000 SUSPENSION ORAL at 12:30

## 2018-09-11 RX ADMIN — NYSTATIN 500000 UNITS: 100000 SUSPENSION ORAL at 10:00

## 2018-09-11 RX ADMIN — GABAPENTIN 600 MG: 300 CAPSULE ORAL at 17:26

## 2018-09-11 RX ADMIN — OXYCODONE HYDROCHLORIDE 10 MG: 5 TABLET ORAL at 09:14

## 2018-09-11 RX ADMIN — CYCLOBENZAPRINE HYDROCHLORIDE 10 MG: 10 TABLET, FILM COATED ORAL at 09:14

## 2018-09-11 RX ADMIN — PIPERACILLIN SODIUM,TAZOBACTAM SODIUM 3.38 G: 3; .375 INJECTION, POWDER, FOR SOLUTION INTRAVENOUS at 06:08

## 2018-09-11 RX ADMIN — SENNOSIDES AND DOCUSATE SODIUM 1 TABLET: 8.6; 5 TABLET ORAL at 17:26

## 2018-09-11 RX ADMIN — NYSTATIN 500000 UNITS: 100000 SUSPENSION ORAL at 21:14

## 2018-09-11 RX ADMIN — Medication 10 ML: at 21:14

## 2018-09-11 RX ADMIN — Medication 10 ML: at 06:09

## 2018-09-11 RX ADMIN — OXYCODONE HYDROCHLORIDE 10 MG: 5 TABLET ORAL at 03:15

## 2018-09-11 RX ADMIN — POLYETHYLENE GLYCOL 3350 17 G: 17 POWDER, FOR SOLUTION ORAL at 17:26

## 2018-09-11 RX ADMIN — GABAPENTIN 600 MG: 300 CAPSULE ORAL at 09:13

## 2018-09-11 RX ADMIN — OXYCODONE HYDROCHLORIDE 10 MG: 5 TABLET ORAL at 06:08

## 2018-09-11 RX ADMIN — ACETAMINOPHEN 650 MG: 325 TABLET, FILM COATED ORAL at 23:54

## 2018-09-11 NOTE — PROGRESS NOTES
Occupational Therapy Note 9/11/2018 Chart reviewed. Pt cleared by RN for therapy. Two attempts made today, however, both attempts, RN at bedside completing blood work and requesting therapist to return later. Will follow-up later as able and appropriate.  
 
Jaycee Whalen, OTR/L

## 2018-09-11 NOTE — ROUTINE PROCESS
Bedside and Verbal shift change report given to Serge Quiles RN (oncoming nurse) by Festus Laughlin RN (offgoing nurse). Report included the following information SBAR, Kardex, ED Summary, OR Summary, Procedure Summary, Intake/Output, MAR, Med Rec Status and Cardiac Rhythm NSR. Patito Lane

## 2018-09-11 NOTE — PROGRESS NOTES
Maria Antonia spoke with Romana Nichols with [facility] and faxed her all of the information that she requested. MARIA ANTONIA will follow. Beau Mallory

## 2018-09-11 NOTE — PROGRESS NOTES
[place]: Coletta Krabbe 
[phone] Requesting/referring provider: Dr. Mendoza  Reason for Consult: SVT 
 
HPI: Dillon Lopez, a 46y.o. year-old who presents for evaluation of onset SVT overnight, rate 170-180. Pt asymptomatic. Broke with 5mg IV lopressor. No chest pain, no dyspnea, pain is under control This am, hypotensive, had fever last night, started on antibiotics and sepsis protocol. No Hx SVT. K, mag low, will replete this am. PTA no palpitations, occasional mild dyspnea. Suboptimal diet, not an exerciser, but active, works as CNA. Subjective: 
Denies CP or SOB. Some pain in right hip/leg. NSR on tele overnight. BP improved. D/w Shirley Almanza NP, for Ortho. Plan for discharge to Rehab pending insurance. OK with Cards for discharge. NSR on tele and BP controlled on Lopressor. Continue Lopressor to Rehab. Assessment/Plan: 1. CVA hx, sounds like hypertensive, no residual deficits 2. Body mass index is 61.45 kg/(m^2). Morbid obesity work on diet control, weight loss 3. HTN- improved - Continue Lopressor 25 mg BID 4. SVT - NSR overnight - Lopressor 25 mg BID 
 - Echo EF 60%. [address] [facility] [address]. Mild MR. Atrial septum bows from left to right, consistent with increased left atrial pressure. 
 - Recommend K at 4.0 and Mg at 2.0 5. Fever, hypotension,   
 -resolved 6. Low Hgb 
 - 8.1- Blood loss FHx + MD, PAD, CVA Soc no tob no etoh She  has a past medical history of Chronic pain; Hypertension; and Stroke [facility]) (2004). 1970 Cardiovascular ROS: no chest pain or dyspnea on exertion Respiratory ROS: no cough, shortness of breath, or wheezing Neurological ROS: no TIA or stroke symptoms All other systems negative except as above. PE 
Vitals:  
 09/12/18 0300 09/12/18 0700 09/12/18 1100 09/12/18 1603 BP: 142/78 145/82 (!) 151/93 100/70 Pulse: 90 100 96 94 Resp: 22 14 15 22 Temp: 98.3 °F (36.8 °C) 98.8 °F (37.1 °C) 99.1 °F (37.3 °C) 98.6 °F (37 °C) SpO2: 97% 100% 100% 98% Weight: (!) 358 lb (162.4 kg) Height:      
 Body mass index is 61.45 kg/(m^2). General appearance - alert, well appearing, and in no distress, morbidly obese Mental status - affect appropriate to mood Eyes - sclera anicteric, moist mucous membranes Chest - clear to auscultation, no wheezes, rales or rhonchi Heart - normal rate, regular rhythm, normal S1, S2, no murmurs, rubs, clicks or gallops Abdomen - obese, soft, nontender, nondistended, bowel sounds present Neurological - cranial nerves II through XII grossly intact, no focal deficit Extremities - peripheral pulses normal, no pedal edema Skin - normal coloration  no rashes Recent Labs: 
No results found for: CHOL, CHOLX, CHLST, CHOLV, 501215, HDL, LDL, LDLC, DLDLP, TGLX, TRIGL, TRIGP, CHHD, CHHDX Lab Results Component Value Date/Time Creatinine 0.69 09/11/2018 03:00 PM  
 
Lab Results Component Value Date/Time BUN 6 09/11/2018 03:00 PM  
 
Lab Results Component Value Date/Time Potassium 3.6 09/11/2018 03:00 PM  
 
Lab Results Component Value Date/Time Hemoglobin A1c 5.4 08/15/2018 12:12 PM  
 
Lab Results Component Value Date/Time HGB 8.1 (L) 09/10/2018 03:45 AM  
 
Lab Results Component Value Date/Time PLATELET 127 00/50/3901 03:45 AM  
 
 
Reviewed: 
Past Medical History:  
Diagnosis Date  Chronic pain BACK  Hypertension  Stroke [facility]) 2004 TIA (SPEECH--NO RESIDUAL) History Smoking Status  Never Smoker Smokeless Tobacco  
 Never Used History Alcohol Use  Yes Comment: SOCIALLY, MONTHLY No Known Allergies No current facility-administered medications for this encounter. Current Outpatient Prescriptions Medication Sig  
 ferrous sulfate (IRON, FERROUS SULFATE,) 325 mg (65 mg iron) tablet Take 1 Tab by mouth Daily (before breakfast).  methocarbamol (ROBAXIN) 750 mg tablet Take 1 Tab by mouth three (3) times daily.  metoprolol tartrate (LOPRESSOR) 25 mg tablet Take 1 Tab by mouth every twelve (12) hours.  senna-docusate (PERICOLACE) 8.6-50 mg per tablet Take 1 Tab by mouth two (2) times a day. Indications: constipation, hold for loose stool or diarrhea  
 oxyCODONE IR (ROXICODONE) 5 mg immediate release tablet Take 1-2 Tabs by mouth every four (4) hours as needed. Max Daily Amount: 60 mg.  
 aspirin 81 mg chewable tablet Take 81 mg by mouth daily.  telmisartan-hydroCHLOROthiazide (MICARDIS HCT) 80-12.5 mg per tablet Take 1 Tab by mouth daily.  ascorbic acid, vitamin C, (VITAMIN C) 500 mg tablet Take  by mouth daily.  cholecalciferol (VITAMIN D3) 400 unit tab tablet Take 800 Units by mouth daily.  gabapentin (NEURONTIN) 300 mg capsule Take 600 mg by mouth two (2) times a day. Sudhakar Valencia MD 
[facility] heart [facility] V[facility] [address], [address]

## 2018-09-11 NOTE — PROGRESS NOTES
Problem: Pressure Injury - Risk of 
Goal: *Prevention of pressure injury Document Glynn Scale and appropriate interventions in the flowsheet. Outcome: Progressing Towards Goal 
Pressure Injury Interventions: 
Sensory Interventions: Discuss PT/OT consult with provider Activity Interventions: PT/OT evaluation Mobility Interventions: Float heels, PT/OT evaluation Nutrition Interventions: Document food/fluid/supplement intake Friction and Shear Interventions: Lift sheet, HOB 30 degrees or less

## 2018-09-11 NOTE — PROGRESS NOTES
Hospitalist Progress Note Nancy Dai MD 
Answering service: [phone] from in house phone Cell: [phone] Date of Service:  2018 NAME:  Ender Fu :  1970 MRN:  [id number] Admission Summary:  
47 y/o female with h/o HTN, chronic pain syndrome,  admitted to the hospital 2018 for chronic pain due to spinal stenosis of the lumbar region. Patient had a L5-S1 right facet cyst removal and L5-S1 fusion on 18. Patient developed fever Tmax 101.3 and tachycardia on , started on antibiotics and sepsis protocol. Patient also found to have SVT, rate 170-180, which broke with 5mg IV Metoprolol. Patient transferred to CCU. Interval history / Subjective:  
F/u fever No more fever. Last documented  at 1 pm  
 
Assessment & Plan: #. Possible sepsis (leukocytosis, fever, tachycardia and hypotension) 
-unclear source but seems like post operative fever, fever started on post OP day two. Due to leukocytosis and fever, started on IV abx/Zosyn for now. -CTA chest - no PE, mild atelectasis in the right lower lobe. Encourage Incentive spirometer use. - UA neg - Blood culture NGTD 
-We can discontinue Zosyn today 
-Discharge ?today #. SVT: new;  broke with 5mg IV Metoprolol. 
-Cardiology following 
-Replete electrolytes, keep K>4 and Mg>2 
-Echo EF 60-65% with no RWMA 
-On Metoprolol, continue #. HTN: currently BP on the lower sie. -Monitor #. Morbid obesity Body mass index is 59.11 kg/(m^2) - Counseling regarding diet, exercise and  weight loss #. Chronic back pain due to spinal stenosis of the lumbar region, status post L5-S1 right facet cyst removal and L5-S1 fusion on 18. 
-Continue management per team 
 
Nothing much to add, will sign off Hospital Problems  Date Reviewed: 2018 Codes Class Noted POA  Morbid obesity (HCC) (Chronic) ICD-10-CM: E66.01 
 ICD-9-CM: 278.01  Unknown Yes Overview Signed 9/7/2018  8:14 AM by Karen Fried, GIANFRANCO Body mass index is 55.61 kg/(m^2). Spinal stenosis of lumbar region ICD-10-CM: M48.061 
ICD-9-CM: 724.02  9/6/2018 Unknown Review of Systems: A comprehensive review of systems was negative except for that written in the HPI. Vital Signs:  
 Last 24hrs VS reviewed since prior progress note. Most recent are: 
Visit Vitals  /81 (BP 1 Location: Left arm, BP Patient Position: At rest)  Pulse 90  Temp 98.8 °F (37.1 °C)  Resp 18  Ht 5' 4\" (1.626 m)  Wt (!) 163.3 kg (360 lb)  SpO2 100%  BMI 61.79 kg/m2 No intake or output data in the 24 hours ending 09/11/18 1147 Physical Examination:  
 
 
     
Constitutional:  No acute distress, cooperative, pleasant   
ENT:  Oral mucous moist, oropharynx benign. Neck supple, Resp:  CTA bilaterally. No wheezing/rhonchi/rales. No accessory muscle use CV:  Regular rhythm, normal rate, no murmurs, gallops, rubs GI:  Soft, non distended, non tender. normoactive bowel sounds, no hepatosplenomegaly Musculoskeletal: Surgical dressing on the lower back Neurologic:  Moves all extremities. AAOx3, CN II-XII reviewed Psych:  Good insight, Not anxious nor agitated. Data Review:  
 Review and/or order of clinical lab test 
Review and/or order of tests in the radiology section of CPT Labs:  
 
Recent Labs  
   09/10/18 
 0345  09/09/18 
 0117 WBC  11.4*  15.6* HGB  8.1*  8.7* HCT  26.7*  27.5*  
PLT  261  222 Recent Labs  
   09/10/18 
 0345  09/09/18 
 0117  09/08/18 
 2202 NA  138  141  141  
K  3.7  3.1*  3.2*  
CL  106  106  106 CO2  26  29  27 BUN  6  5*  6  
CREA  0.64  0.64  0.67 GLU  109*  118*  130* CA  8.2*  8.3*  8.5 MG  2.2  1.8   --   
 
Recent Labs  
   09/09/18 0117 09/08/18 
 2202 SGOT  23  23 ALT  20  22 AP  65  60 TBILI  0.3  0.4 TP  6.1*  5.8*  
 ALB  2.5*  2.3*  
GLOB  3.6  3.5 No results for input(s): INR, PTP, APTT in the last 72 hours. No lab exists for component: INREXT, INREXT No results for input(s): FE, TIBC, PSAT, FERR in the last 72 hours. No results found for: FOL, RBCF No results for input(s): PH, PCO2, PO2 in the last 72 hours. No results for input(s): CPK, CKNDX, TROIQ in the last 72 hours. No lab exists for component: CPKMB No results found for: CHOL, CHOLX, CHLST, CHOLV, HDL, LDL, LDLC, DLDLP, TGLX, TRIGL, TRIGP, CHHD, CHHDX Lab Results Component Value Date/Time Glucose (POC) 107 (H) 09/09/2018 12:19 PM  
 Glucose (POC) 119 (H) 09/09/2018 07:14 AM  
 Glucose (POC) 110 (H) 09/06/2018 06:56 AM  
 
Lab Results Component Value Date/Time Color YELLOW/STRAW 09/09/2018 07:49 PM  
 Appearance CLOUDY (A) 09/09/2018 07:49 PM  
 Specific gravity 1.020 09/09/2018 07:49 PM  
 pH (UA) 6.5 09/09/2018 07:49 PM  
 Protein TRACE (A) 09/09/2018 07:49 PM  
 Glucose NEGATIVE  09/09/2018 07:49 PM  
 Ketone NEGATIVE  09/09/2018 07:49 PM  
 Bilirubin NEGATIVE  09/09/2018 07:49 PM  
 Urobilinogen 0.2 09/09/2018 07:49 PM  
 Nitrites NEGATIVE  09/09/2018 07:49 PM  
 Leukocyte Esterase NEGATIVE  09/09/2018 07:49 PM  
 Epithelial cells MODERATE (A) 09/09/2018 07:49 PM  
 Bacteria NEGATIVE  09/09/2018 07:49 PM  
 WBC 0-4 09/09/2018 07:49 PM  
 RBC 0-5 09/09/2018 07:49 PM  
 
 
 
Medications Reviewed:  
 
Current Facility-Administered Medications Medication Dose Route Frequency  nystatin (MYCOSTATIN) 100,000 unit/mL oral suspension 500,000 Units  500,000 Units Oral QID  sorbitol 70 % solution 30 mL  30 mL Oral DAILY PRN  
 sorbitol 70 % solution 30 mL  30 mL Oral ONCE  
 metoprolol tartrate (LOPRESSOR) tablet 25 mg  25 mg Oral Q12H  piperacillin-tazobactam (ZOSYN) 3.375 g in 0.9% sodium chloride (MBP/ADV) 100 mL  3.375 g IntraVENous Q8H  
 polyethylene glycol (MIRALAX) packet 17 g  17 g Oral BID  
  ascorbic acid (vitamin C) (VITAMIN C) tablet 500 mg  500 mg Oral DAILY  gabapentin (NEURONTIN) capsule 600 mg  600 mg Oral BID  sodium chloride (NS) flush 5-10 mL  5-10 mL IntraVENous Q8H  
 sodium chloride (NS) flush 5-10 mL  5-10 mL IntraVENous PRN  
 acetaminophen (TYLENOL) tablet 650 mg  650 mg Oral Q6H  
 oxyCODONE IR (ROXICODONE) tablet 5 mg  5 mg Oral Q3H PRN  
 oxyCODONE IR (ROXICODONE) tablet 10 mg  10 mg Oral Q3H PRN  
 naloxone (NARCAN) injection 0.4 mg  0.4 mg IntraVENous PRN  
 senna-docusate (PERICOLACE) 8.6-50 mg per tablet 1 Tab  1 Tab Oral BID  
 bisacodyl (DULCOLAX) suppository 10 mg  10 mg Rectal DAILY PRN  
 benzocaine-menthol (CEPACOL) lozenge 1 Lozenge  1 Lozenge Oral PRN  
 cyclobenzaprine (FLEXERIL) tablet 10 mg  10 mg Oral BID PRN  
 
______________________________________________________________________ EXPECTED LENGTH OF STAY: 2d 21h ACTUAL LENGTH OF STAY:          5 Hoda Ying MD

## 2018-09-11 NOTE — PROGRESS NOTES
Bedside and Verbal shift change report given to SAMUEL Sanchez (oncoming nurse) by Lindon Meigs, RN (offgoing nurse). Report included the following information SBAR, Kardex, Procedure Summary, MAR, Recent Results and Cardiac Rhythm NSR.

## 2018-09-11 NOTE — DISCHARGE INSTRUCTIONS
After Hospital Care Plan:  Discharge Instructions Lumbar Fusion Surgery   Dr. Garrett Notice     Patient Name: Ioana Dean    Date of procedure: 9/6/2018      Date of discharge: 9/7/2018    Procedure: Procedure(s):  L5-S1 RIGHT FACET CYST REMOVAL,  L5- S1 FUSION WITH MAZOR ([place] Sheer X)      PCP: Guadalupe Patel MD    Your blood pressure has been low postoperatively and should stop your Micardis HCT until you see your primary care doctor. You were found to have an arrhythmia postoperatively called SVT. You should continue metoprolol twice daily to control your heart rate. This may cause dizziness or low blood pressure. Please check your blood pressure daily at home and call your doctor for low blood pressure or symptoms of lightheadedness, dizziness, or fainting. Follow up appointments  -follow up with Dr. Garrett Notice in 2 weeks.   Call [phone] to make an appointment as soon as you get home from the hospital.    When to call your Orthopaedic Surgeon:  -Signs of infection-if your incision is red; continues to have drainage; drainage has a foul odor or if you have a persistent fever over 101 degrees for 24 hours  -Nausea or vomiting, severe headache  -Loss of bowel or bladder function, inability to urinate  -Changes in sensation in your arms or legs (numbness, tingling, loss of color)  -Increased weakness-greater than before your surgery  -Severe pain or pain not relieved by medications  -Signs of a blood clot in your leg-calf pain, tenderness, redness, swelling of lower leg    When to call your Primary Care Physician:  -Concerns about medical conditions such as diabetes, high blood pressure, asthma, congestive heart failure  -Call if blood sugars are elevated, persistent headache or dizziness, coughing or congestion, constipation or diarrhea, burning with urination, abnormal heart rate    When to call 911 and go to the nearest emergency room:  -Acute onset of chest pain, shortness of breath, difficulty breathing    Activity  -You are going home a well person, be as active as possible. Your only exercise should be walking. Start with short frequent walks and increase your walking distance each day.  -Limit the amount of time you sit to 20-30 minute intervals. Sitting for prolonged periods of time will be uncomfortable for you following surgery.  -Do NOT lift anything over 5 pounds  -Do NOT do any straining, twisting or bending  -When you are in bed, you may lay on your back or on either side. Do NOT lie on your stomach    Brace  -If you have a back brace, you should wear your brace at all times when you are out of bed. Do not wear the brace while in bed or showering.  -Remember to always wear a cotton t-shirt underneath your brace.  -Do not bend or twist when your brace is off    Diet  -Resume usual diet; drink plenty of fluids; eat foods high in fiber  -It is important to have regular bowel movements. Pain medications may cause constipation. You may want to take a stool softener (such as Senokot-S or Colace) to prevent constipation.   -If constipation occurs, take a laxative (such as Dulcolax tablets, Milk of Magnesia, or a suppository). Laxatives should only be used if the above preventable measures have failed and you still have not had a bowel movement after three days    Driving  -You may not drive or return to work until instructed by your physician. However, you may ride in the car for short periods of time. Incision Care  -You may take brief showers but do not run the water run directly onto the wound. After showering or bathing, remove the wet dressing and gently blot the wound dry with a soft towel.  -Do not rub or apply any lotions or ointments to your incision site.   -Do not soak or scrub your wound  -Keep a dry dressing (ABD and paper tape) on your incision and have it changed daily for 14 days after surgery; more often if your incision is draining.   Have your caregiver wash their hands thoroughly before changing your dressing.  -You will have absorbable sutures and steristrips (white tape) on your incision. Leave the steristrips on until they fall off. Showering  -You may shower in approximately 4 days after your surgery.    -Leave the dressing on during your shower. Do NOT allow the water to run directly onto your dressing. Once you get out of the shower, put on a dry dressing.  -Reminder- your brace can be removed while showering. Remember to not bend or twist while your brace is off.    -Do not take a tub bath. Preventing blood clots  -You have been given T.E.D. stockings to wear. Continue to wear these for 7 days after your discharge. Put them on in the morning and take them off at night.    -They are used to increase your circulation and prevent blood clots from forming in your legs  -T. E.D. stockings can be machine washed, temperature not to exceed 160° F (71°C) and machine dried for 15 to 20 minutes, temperature not to exceed 250° F (121°C). Pain management  -Take pain medication as prescribed; decrease the amount you use as your pain lessens  -DO not wait until you are in extreme pain to take your medication.  -Avoid alcoholic beverages while taking pain medication    Pain Medication Safety  DO:  -Read the Medication Guide   -Take your medicine exactly as prescribed   -Store your medicine away from children and in a safe place   -Flush unused medicine down the toilet   -Call your healthcare provider for medical advice about side effects. You may report side effects to FDA at 4-690-FDA-2690.   -Please be aware that many medications contain Tylenol. We do not want you to over medicate so please read the information below as a guide. Do not take more than 4 Grams of Tylenol in a 24 hour period.   (There are 1000 milligrams in one Gram) Percocet contains 325 mg of Tylenol per tablet (do not take more than 12 tablets in 24 hours)  Lortab contains 500 mg of Tylenol per tablet (do not take more than 8 tablets in 24 hours)  Norco contains 325 mg of Tylenol per tablet (do not take more than 12 tablets in 24 hours). DO NOT:  -Do not give your medicine to others   -Do not take medicine unless it was prescribed for you   -Do not stop taking your medicine without talking to your healthcare provider   -Do not break, chew, crush, dissolve, or inject your medicine. If you cannot swallow your medicine whole, talk to your healthcare provider.  -Do not drink alcohol while taking this medicine  -Do not take anti-inflammatory medications or aspirin unless instructed by your      Physician.

## 2018-09-11 NOTE — PROGRESS NOTES
Spiritual Care Partner Volunteer visited patient in [facility] on 9/11/2018. Documented by: 
Chaplain Mixon MDiv, MS, [place] [id number] PRAY ([phone])

## 2018-09-11 NOTE — PROGRESS NOTES
Problem: Falls - Risk of 
Goal: *Absence of Falls Document Osmany Hernandez Fall Risk and appropriate interventions in the flowsheet. Outcome: Progressing Towards Goal 
Fall Risk Interventions: 
Mobility Interventions: Communicate number of staff needed for ambulation/transfer, Patient to call before getting OOB Mentation Interventions: Adequate sleep, hydration, pain control Medication Interventions: Patient to call before getting OOB, Teach patient to arise slowly Elimination Interventions: Call light in reach, Patient to call for help with toileting needs

## 2018-09-11 NOTE — PROGRESS NOTES
MARIA ANTONIA received notice from [facility] that they are interested in this pt and they are wondering when pt is ready for d/c. MARIA ANTONIA spoke with GIANFRANCO Larose to discuss. She said that this pt should be ready in the next 48 hours. MARIA ANTONIA called Tomeka Merida ([phone]), liason with [facility], to inform her of this. She stated that she will \"write up\" this case and submit it to her medical director today. Will follow. Joselyn Salas

## 2018-09-11 NOTE — PROGRESS NOTES
Problem: Mobility Impaired (Adult and Pediatric) Goal: *Acute Goals and Plan of Care (Insert Text) Physical Therapy Goals Reassessment visit completed 9/11/18. Goals appropriate for carryover. Initiated 9/7/2018 1. Patient will move from supine to sit and sit to supine , scoot up and down and roll side to side in bed with independence within 4 days. 2. Patient will perform sit to stand with independence within 4 days. 3. Patient will ambulate with independence for 100 feet with the least restrictive device within 4 days. 4. Patient will ascend/descend 4 stairs with 1 handrail(s) with supervision/set-up within 4 days. 5. Patient will verbalize and demonstrate understanding of spinal precautions (No bending, lifting greater than 5 lbs, or twisting; log-roll technique; frequent repositioning as instructed) within 4 days. physical Therapy TREATMENT - weekly reassessment Patient: Gunnar Raines (46 y.o. female) Date: 9/11/2018 Diagnosis: LUMBAR STENOSIS, LUMBAR DISC HERNIATION Spinal stenosis of lumbar region <principal problem not specified> Procedure(s) (LRB): 
L5-S1 RIGHT FACET CYST REMOVAL,  L5- S1 FUSION WITH MAZOR (REQ MAZOR X) (N/A) 5 Days Post-Op Precautions: Back Chart, physical therapy assessment, plan of care and goals were reviewed. ASSESSMENT: 
Cleared for mobility progression by RN. Seen for reassessment visit - all goals appropriate for carryover. She remains limited by R sided low back and LE muscle spasms with mobility, post op pain, labile but improved HR, mobility restrictions, and generalized weakness leading to impaired gait, balance, and functional independence s/p L5-S1 laminectomy with fusion, now POD 5. She was able to recall 3/3 spinal precautions however required occasional cues to maintain while completing bed mobility via log roll sequence (slightly impulsive 2* pain).  Continues to require up to mod A x1-2 to achieve sitting EOB. Demos good/fair sitting balance at EOB while assisting to don quick draw brace. Completed sit<>stands x3 reps total throughout session - cues for hand placement sequencing and min A x2. Gait training advanced in hallway for 100ft x2 reps (standing rest break 2* fatigue and HR) with CGA and RW - cues for pacing, AD proximity and centering, and deep breathing to avoid valsalva. HR ranged mid to low 100s with activity. Remained up in chair at end of session, NAD. Continue to recommend discharge to acute IP rehab once medically cleared. Progression toward goals: 
[]      Improving appropriately and progressing toward goals [x]      Improving slowly and progressing toward goals 
[]      Not making progress toward goals and plan of care will be adjusted PLAN: 
Patient continues to benefit from skilled intervention to address the above impairments. Continue treatment per established plan of care. Discharge Recommendations:  Inpatient Rehab Further Equipment Recommendations for Discharge:  Has bariatric RW SUBJECTIVE:  
Patient stated I'm going to rehab today.  The patient stated 3/3 back precautions. Reviewed all 3 with patient. OBJECTIVE DATA SUMMARY:  
Critical Behavior: 
Neurologic State: Alert, Eyes open spontaneously Orientation Level: Oriented X4 Cognition: Appropriate for age attention/concentration, Appropriate safety awareness, Appropriate decision making, Follows commands Safety/Judgement: Awareness of environment, Insight into deficits, Good awareness of safety precautions Functional Mobility Training: 
Bed Mobility: 
Log   
Supine to Sit: Moderate assistance; Additional time Brace donned with  minimal assistance/contact guard assist  
Transfers: 
Sit to Stand: Assist x2;Minimum assistance Stand to Sit: Contact guard assistance Balance: 
Sitting: Intact Standing: Impaired Standing - Static: Good;Constant support Standing - Dynamic : Fair Ambulation/Gait Training: 
Distance (ft): 100 Feet (ft) (x2 reps) Assistive Device: Gait belt;Brace/Splint; Walker, rolling Ambulation - Level of Assistance: Contact guard assistance Gait Description (WDL): Exceptions to [facility] Gait Abnormalities: Antalgic;Decreased step clearance;Shuffling gait;Trunk sway increased Base of Support: Widened Speed/Nallely: Shuffled; Slow Step Length: Left shortened;Right shortened Pain: 
Pain Scale 1: Numeric (0 - 10) Pain Intensity 1: 0 Pain Location 1: Back Pain Orientation 1: Lower;Mid 
Pain Description 1: Constant; Raeann Babinski Pain Intervention(s) 1: Rest 
Activity Tolerance:  
HR ranged low to mid 100s with activity Please refer to the flowsheet for vital signs taken during this treatment. After treatment:  
[x]  Patient left in no apparent distress sitting up in chair 
[]  Patient left in no apparent distress in bed 
[x]  Call bell left within reach [x]  Nursing notified 
[]  Caregiver present 
[]  Bed alarm activated COMMUNICATION/COLLABORATION:  
The patients plan of care was discussed with: Occupational Therapist and Registered Nurse Viri Christianson, PT, DPT Time Calculation: 26 mins

## 2018-09-11 NOTE — ADVANCED PRACTICE NURSE
Cardiology Update No further cardiac testing needed We will sign off at this point. Please feel free to contact us for any future problems. Thank you for allowing us to participate in the care of Bina Beatty.  
 
Baylee Funez NP

## 2018-09-11 NOTE — PROGRESS NOTES
Orthopedic Spine Progress Note Post Op day: 5 Days Post-Op 2018 11:50 AM  
 
Chandu Guerrero Vital Signs:   
Patient Vitals for the past 8 hrs: 
 BP Temp Pulse Resp SpO2  
18 1100 132/81 98.8 °F (37.1 °C) 90 18 100 % 18 0914 132/65 - 97 - -  
18 0800 - - - - 97 % 18 0700 105/68 98.3 °F (36.8 °C) 91 20 97 % Temp (24hrs), Av.1 °F (37.3 °C), Min:98.3 °F (36.8 °C), Max:100.4 °F (38 °C) Intake/Output: 
  
 1901 - 700 In: 1460.4 [I.V.:1460.4] Out: 450 [Urine:450] Pain Control:  
Pain Assessment Pain Scale 1: Numeric (0 - 10) Pain Intensity 1: 0 Pain Onset 1: post op Pain Location 1: Back Pain Orientation 1: Lower, Mid 
Pain Description 1: Constant, Sharp Pain Intervention(s) 1: Rest 
 
LAB:   
Recent Labs  
   09/10/18 
 0345 HCT  26.7* HGB  8.1* Lab Results Component Value Date/Time Sodium 138 09/10/2018 03:45 AM  
 Potassium 3.7 09/10/2018 03:45 AM  
 Chloride 106 09/10/2018 03:45 AM  
 CO2 26 09/10/2018 03:45 AM  
 Glucose 109 (H) 09/10/2018 03:45 AM  
 BUN 6 09/10/2018 03:45 AM  
 Creatinine 0.64 09/10/2018 03:45 AM  
 Calcium 8.2 (L) 09/10/2018 03:45 AM  
 
 
Subjective:  Chandu Guerrero is a 46 y.o. female s/p a  Procedure(s): 
L5-S1 RIGHT FACET CYST REMOVAL,  L5- S1 FUSION WITH MAZOR (REQ MAZOR X) Procedure(s): 
L5-S1 RIGHT FACET CYST REMOVAL,  L5- S1 FUSION WITH MAZOR (REQ MAZOR X). Tolerating diet. Objective: General: alert, cooperative, no distress. Gastrointestinal:  Soft, non-tender. Neurological: Neurovascular exam within normal limits. Sensation stable. Motor: unchanged C5-T1 and L2-S1. Leg improved Musculoskeletal:  Cliff's sign negative in bilateral lower extremities. Calves soft, supple, non-tender upon palpation or with passive stretch. Skin: Incision - clean, dry and intact. No significant erythema or swelling. Dressing: clean, dry, and intact PT/OT:  
Gait:  Gait Base of Support: Widened Speed/Nallely: Slow, Pace decreased (<100 feet/min) (improved with distance) Step Length: Right shortened, Left shortened Gait Abnormalities: Antalgic, Decreased step clearance, Trunk sway increased, Path deviations (all decreased with distance) Ambulation - Level of Assistance: Contact guard assistance, Stand-by assistance Distance (ft): 200 Feet (ft) Assistive Device: Gait belt, Walker, rolling, Brace/Splint Rail Use: Right  (both hands on right ) Stairs - Level of Assistance: Assist X1, Moderate assistance Number of Stairs Trained: 2 Assessment:  
 s/p Procedure(s): 
L5-S1 RIGHT FACET CYST REMOVAL,  L5- S1 FUSION WITH MAZOR (REQ MAZOR X) Active Problems: 
  Spinal stenosis of lumbar region (9/6/2018) Morbid obesity ([facility] [address] () Overview: Body mass index is 55.61 kg/(m^2). Plan: 1. Continue PT/OT 2. Continue established methods of pain control 3. VTE Prophylaxes - TEDS &/or SCDs Discharge To: home vs rehab Signed By: Ruiz Morris MD

## 2018-09-11 NOTE — PROGRESS NOTES
Orthopedic Spine Progress Note Malia Og, AGACNP-BC Work Cell: [phone] Post Op Day: 5 Day Post-Op September 11, 2018 12:53 PM  
 
Ryan Villarreal HPI:  
  
Ryan Villarreal is a 46 y.o. female with progressive back and right leg pain. Pain onset approximately one year ago. She has pain running down the back of her leg and into her foot. She has accompanying numbness as well. Her MRI revealed a facet cyst at L5-S1 to the right with severe facet arthropathy. After a discussion of the risks, benefits, and alternatives, Ryan Villarreal consented to undergo a  Procedure(s): 
L5-S1 RIGHT FACET CYST REMOVAL,  L5- S1 FUSION WITH MAZOR (REQ MAZOR X) Subjective  
  
Patient lying on right side in bed. Complains of some intermittent spasms in her right leg. No paresthesias. Passing gas but no BM since surgery. PT recommended rehab yesterday and referrals were sent to [facility]. She will need insurance authorization. Cardiology following for new onset SVT over weekend and Hospitalist following for SIRS. She has symptoms of thrush. Patient denies headache, dizziness, cp, cough, dyspnea, abdominal pain, f/c, or n/v. 
 
  
 
 
 
Objective:  
 
Physical Exam: 
General:  Alert and oriented. No acute distress. Respiratory:  Breathing unlabored. Abdomen:  
Extremities: Obese, Soft, non-tender, non-distended, hypoactive bowel sounds No evidence of cyanosis. No edema in extremities Neurologic: 
 
Musculoskeletal:  No new motor deficits. Neurovascular exam within normal limits. Sensation stable. Motor: unchanged  L2-S1. Calves soft, nontender upon palpation and with passive stretch  Moves both upper and lower extremities. Incision: clean, dry, and intact. No significant erythema or swelling. No active drainage noted.    
 
 
  
 
Vital Signs:   
Patient Vitals for the past 8 hrs: 
 BP Temp Pulse Resp SpO2 Weight  
09/11/18 0700 105/68 98.3 °F (36.8 °C) 91 20 97 % -  
 18 0300 121/77 98.6 °F (37 °C) 93 18 99 % (!) 163.3 kg (360 lb) Temp (24hrs), Av.1 °F (37.3 °C), Min:98.3 °F (36.8 °C), Max:100.4 °F (38 °C) Intake/Output: 
  
 1901 -  0700 In: 1460.4 [I.V.:1460.4] Out: 450 [Urine:450] Pain Control:  
Pain Assessment Pain Scale 1: Numeric (0 - 10) Pain Intensity 1: 6 Pain Onset 1: post op Pain Location 1: Back Pain Orientation 1: Lower, Mid 
Pain Description 1: Constant, Sharp Pain Intervention(s) 1: Rest 
 
LAB:   
Recent Labs  
   09/10/18 
 0345 HCT  26.7* HGB  8.1* Lab Results Component Value Date/Time Sodium 138 09/10/2018 03:45 AM  
 Potassium 3.7 09/10/2018 03:45 AM  
 Chloride 106 09/10/2018 03:45 AM  
 CO2 26 09/10/2018 03:45 AM  
 Glucose 109 (H) 09/10/2018 03:45 AM  
 BUN 6 09/10/2018 03:45 AM  
 Creatinine 0.64 09/10/2018 03:45 AM  
 Calcium 8.2 (L) 09/10/2018 03:45 AM  
 
 
PT/OT:  
Gait:  Gait Base of Support: Widened Speed/Nallely: Slow, Pace decreased (<100 feet/min) (improved with distance) Step Length: Right shortened, Left shortened Gait Abnormalities: Antalgic, Decreased step clearance, Trunk sway increased, Path deviations (all decreased with distance) Ambulation - Level of Assistance: Contact guard assistance, Stand-by assistance Distance (ft): 200 Feet (ft) Assistive Device: Gait belt, Walker, rolling, Brace/Splint Rail Use: Right  (both hands on right ) Stairs - Level of Assistance: Assist X1, Moderate assistance Number of Stairs Trained: 2 Assessment/Plan  
  
1. 5 Days Post-Op Procedure(s): 
L5-S1 RIGHT FACET CYST REMOVAL,  L5- S1 FUSION WITH MAZOR (REQ MAZOR X) 
 -Continue PT/OT. Brace when oob 
 -Pain control- PRN oxycodone, scheduled APAP, ice to back 
 -voiding appropriately 
 -Encouraged incentive Spirometer 
 -Tolerating diet 
 -VTE Prophylaxes - TEDS & SCDs 2. SIRS with fever, tachycardia, and leukocytosis 
 -afebrile for 24 hours 
 -urinalysis negative -CTA 9/9 negative 
 -blood cultures 9/8 ngtd 
 -on empiric Zosyn, will discuss stopping with hospitalist 
 
3. SVT 
 -now in SR with HR in 90's 
 -appreciate cardiology consult 
 -echo stable with normal EF, mild MR 
 -continue on metoprolol 25 mg per Cardiology 4. Hx of Chronic hypertension 
 -/68 this am 
 -home losartan-hctz were stopped due to hypotension 5. Morbid obesity 
 -Body mass index is 61.79 kg/(m^2). -Encourage weight loss with diet and exercise program once cleared by orthopedic surgeon 6. Acute postoperative blood loss anemia -hgb 8.1 
 -anticipated blood loss anemia perioperatively. Continue to monitor 7. Constipation  
 -add sorbitol to current regimen of BID miralax & pericolace 
 -prn suppository 8. Oropharyngeal candidiasis 
 -likely due to antibiotics 
 -nystatin swish Plan above discussed with Dr. Juan Cortes, Dr. Roberth Higgins, and cardiology Discharge To:  Rehab pending insurance authorization. I discussed increasing activity with patient today and working on becoming independent with bed mobility.   
 
Signed By: Karen Fried NP

## 2018-09-12 VITALS
RESPIRATION RATE: 22 BRPM | OXYGEN SATURATION: 98 % | TEMPERATURE: 98.6 F | HEIGHT: 64 IN | HEART RATE: 94 BPM | BODY MASS INDEX: 50.02 KG/M2 | SYSTOLIC BLOOD PRESSURE: 100 MMHG | DIASTOLIC BLOOD PRESSURE: 70 MMHG | WEIGHT: 293 LBS

## 2018-09-12 PROCEDURE — 74011000250 HC RX REV CODE- 250: Performed by: NURSE PRACTITIONER

## 2018-09-12 PROCEDURE — 74011250637 HC RX REV CODE- 250/637: Performed by: PHYSICIAN ASSISTANT

## 2018-09-12 PROCEDURE — 97535 SELF CARE MNGMENT TRAINING: CPT

## 2018-09-12 PROCEDURE — 74011250637 HC RX REV CODE- 250/637: Performed by: NURSE PRACTITIONER

## 2018-09-12 PROCEDURE — 97116 GAIT TRAINING THERAPY: CPT

## 2018-09-12 PROCEDURE — 74011250637 HC RX REV CODE- 250/637: Performed by: ORTHOPAEDIC SURGERY

## 2018-09-12 RX ORDER — METHOCARBAMOL 750 MG/1
750 TABLET, FILM COATED ORAL 3 TIMES DAILY
Qty: 30 TAB | Refills: 0 | Status: SHIPPED | OUTPATIENT
Start: 2018-09-12

## 2018-09-12 RX ORDER — LANOLIN ALCOHOL/MO/W.PET/CERES
325 CREAM (GRAM) TOPICAL
Qty: 30 TAB | Refills: 1 | Status: SHIPPED | OUTPATIENT
Start: 2018-09-12

## 2018-09-12 RX ORDER — METHOCARBAMOL 750 MG/1
750 TABLET, FILM COATED ORAL 2 TIMES DAILY
Status: DISCONTINUED | OUTPATIENT
Start: 2018-09-12 | End: 2018-09-12

## 2018-09-12 RX ORDER — GABAPENTIN 300 MG/1
900 CAPSULE ORAL 2 TIMES DAILY
Status: DISCONTINUED | OUTPATIENT
Start: 2018-09-12 | End: 2018-09-12 | Stop reason: HOSPADM

## 2018-09-12 RX ORDER — METHOCARBAMOL 750 MG/1
750 TABLET, FILM COATED ORAL 3 TIMES DAILY
Status: DISCONTINUED | OUTPATIENT
Start: 2018-09-12 | End: 2018-09-12 | Stop reason: HOSPADM

## 2018-09-12 RX ORDER — FACIAL-BODY WIPES
10 EACH TOPICAL ONCE
Status: DISCONTINUED | OUTPATIENT
Start: 2018-09-12 | End: 2018-09-12 | Stop reason: HOSPADM

## 2018-09-12 RX ADMIN — GABAPENTIN 600 MG: 300 CAPSULE ORAL at 09:16

## 2018-09-12 RX ADMIN — METOPROLOL TARTRATE 25 MG: 25 TABLET ORAL at 09:17

## 2018-09-12 RX ADMIN — SENNOSIDES AND DOCUSATE SODIUM 1 TABLET: 8.6; 5 TABLET ORAL at 09:17

## 2018-09-12 RX ADMIN — POLYETHYLENE GLYCOL 3350 17 G: 17 POWDER, FOR SOLUTION ORAL at 18:15

## 2018-09-12 RX ADMIN — POLYETHYLENE GLYCOL 3350 17 G: 17 POWDER, FOR SOLUTION ORAL at 09:56

## 2018-09-12 RX ADMIN — GABAPENTIN 900 MG: 300 CAPSULE ORAL at 18:15

## 2018-09-12 RX ADMIN — NYSTATIN 500000 UNITS: 100000 SUSPENSION ORAL at 13:56

## 2018-09-12 RX ADMIN — SENNOSIDES AND DOCUSATE SODIUM 1 TABLET: 8.6; 5 TABLET ORAL at 18:15

## 2018-09-12 RX ADMIN — CYCLOBENZAPRINE HYDROCHLORIDE 10 MG: 10 TABLET, FILM COATED ORAL at 00:23

## 2018-09-12 RX ADMIN — NYSTATIN 500000 UNITS: 100000 SUSPENSION ORAL at 09:56

## 2018-09-12 RX ADMIN — OXYCODONE HYDROCHLORIDE 10 MG: 5 TABLET ORAL at 18:15

## 2018-09-12 RX ADMIN — METHOCARBAMOL 500 MG: 500 TABLET ORAL at 09:16

## 2018-09-12 RX ADMIN — ACETAMINOPHEN 650 MG: 325 TABLET, FILM COATED ORAL at 11:29

## 2018-09-12 RX ADMIN — OXYCODONE HYDROCHLORIDE 10 MG: 5 TABLET ORAL at 11:29

## 2018-09-12 RX ADMIN — ACETAMINOPHEN 650 MG: 325 TABLET, FILM COATED ORAL at 18:15

## 2018-09-12 RX ADMIN — METHOCARBAMOL 750 MG: 750 TABLET ORAL at 16:05

## 2018-09-12 RX ADMIN — OXYCODONE HYDROCHLORIDE AND ACETAMINOPHEN 500 MG: 500 TABLET ORAL at 09:16

## 2018-09-12 NOTE — PROGRESS NOTES
Problem: Falls - Risk of 
Goal: *Absence of Falls Document [place] Getting Fall Risk and appropriate interventions in the flowsheet. Outcome: Progressing Towards Goal 
Fall Risk Interventions: 
Mobility Interventions: PT Consult for mobility concerns, PT Consult for assist device competence, Patient to call before getting OOB, Communicate number of staff needed for ambulation/transfer, OT consult for ADLs Mentation Interventions: Adequate sleep, hydration, pain control, Door open when patient unattended, Update white board, More frequent rounding Medication Interventions: Patient to call before getting OOB, Teach patient to arise slowly, Evaluate medications/consider consulting pharmacy Elimination Interventions: Call light in reach, Toileting schedule/hourly rounds

## 2018-09-12 NOTE — PROGRESS NOTES
Jamari received a call from Tatyana Duval at [employer] informing this CM that this pt has been denied for Inpt  Rehab. CM informed NP Joanna Rivera of this. She stated that she is going to d/c pt to home with home health. She had previously been set up with [facility]. CM called Eula Roa at [facility] to inform her of d/c. She stated that this pt will be opened to  services tomorrow. CM informed pt who verbalized understanding. Neena Choi

## 2018-09-12 NOTE — PROGRESS NOTES
Problem: Pressure Injury - Risk of 
Goal: *Prevention of pressure injury Document Glynn Scale and appropriate interventions in the flowsheet. Outcome: Progressing Towards Goal 
Pressure Injury Interventions: 
Sensory Interventions: Discuss PT/OT consult with provider, Float heels, Keep linens dry and wrinkle-free Activity Interventions: PT/OT evaluation, Increase time out of bed Mobility Interventions: Float heels, PT/OT evaluation Nutrition Interventions: Offer support with meals,snacks and hydration Friction and Shear Interventions: Lift sheet

## 2018-09-12 NOTE — PROGRESS NOTES
Problem: Mobility Impaired (Adult and Pediatric) Goal: *Acute Goals and Plan of Care (Insert Text) Physical Therapy Goals Reassessment visit completed 9/11/18. Goals appropriate for carryover. Initiated 9/7/2018 1. Patient will move from supine to sit and sit to supine , scoot up and down and roll side to side in bed with independence within 4 days. 2. Patient will perform sit to stand with independence within 4 days. 3. Patient will ambulate with independence for 100 feet with the least restrictive device within 4 days. 4. Patient will ascend/descend 4 stairs with 1 handrail(s) with supervision/set-up within 4 days. 5. Patient will verbalize and demonstrate understanding of spinal precautions (No bending, lifting greater than 5 lbs, or twisting; log-roll technique; frequent repositioning as instructed) within 4 days. physical Therapy TREATMENT Patient: Ming Brewer (46 y.o. female) Date: 9/12/2018 Diagnosis: LUMBAR STENOSIS, LUMBAR DISC HERNIATION Spinal stenosis of lumbar region <principal problem not specified> Procedure(s) (LRB): 
L5-S1 RIGHT FACET CYST REMOVAL,  L5- S1 FUSION WITH MAZOR (REQ MAZOR X) (N/A) 6 Days Post-Op Precautions: Back Chart, physical therapy assessment, plan of care and goals were reviewed. ASSESSMENT: 
Cleared for mobility progression by RN. Received pt supine in bed, reports pending discharge home this PM (insurance denied rehab) and will have assistance of spouse and has rented a hospital bed. She was able to recall 3/3 spinal precautions and don quick draw brace without assist. HR monitored throughout session and ranged low to mid 100s. Denies dizziness with positional changes. Completed supine>sit EOB via log roll sequencing with CGA and increased time/effort. Gait training completed in hallway with bariatric RW and CGA for safety - demos marked improvements in overall aleena, quality, and stability compared to previous session.  No overt LOB, difficulty, or complaint of spasm. Participated in stair negotiation training in prep for home entry - instructed in correct sequent with use of one rail - overall CGA level w/o knee buckle or LOB. Remained up in chair at end of session, NAD. Answered questions to satisfaction. Anticipate that she will be appropriate for discharge home with assist from family, use of RW, and HHPT. Will continue to follow during admission. Progression toward goals: 
[]      Improving appropriately and progressing toward goals [x]      Improving slowly and progressing toward goals 
[]      Not making progress toward goals and plan of care will be adjusted PLAN: 
Patient continues to benefit from skilled intervention to address the above impairments. Continue treatment per established plan of care. Discharge Recommendations:  Home Health and family assist 
Further Equipment Recommendations for Discharge:  Bariatric RW - already has. Rented a hospital bed as well. SUBJECTIVE:  
Patient stated I'm ready to get Tawanna here.  The patient stated 3/3 back precautions. Reviewed all 3 with patient. OBJECTIVE DATA SUMMARY:  
Critical Behavior: 
Neurologic State: Alert Orientation Level: Oriented X4 Cognition: Appropriate for age attention/concentration, Follows commands Safety/Judgement: Awareness of environment, Fall prevention Functional Mobility Training: 
Bed Mobility: 
Log   
Supine to Sit: Minimum assistance; Additional time Scooting: Stand-by assistance Brace donned with  independence Transfers: 
Sit to Stand: Stand-by assistance Stand to Sit: Stand-by assistance Balance: 
Sitting: Intact Standing: Impaired; Without support Standing - Static: Good;Constant support Standing - Dynamic : Good (with support of RW) Ambulation/Gait Training: 
Distance (ft): 150 Feet (ft) Assistive Device: Gait belt;Brace/Splint; Walker, rolling Ambulation - Level of Assistance: Contact guard assistance Gait Abnormalities: Decreased step clearance Base of Support: Widened Speed/Nallely: Slow Step Length: Left shortened;Right shortened Stairs: 
Number of Stairs Trained: 4 Stairs - Level of Assistance: Assist X1;Contact guard assistance Rail Use: Left Pain: 
Pain Scale 1: Numeric (0 - 10) Pain Intensity 1: 0 Pain Location 1: Back;Leg 
Pain Orientation 1: Medial;Lower;Right Pain Description 1: Constant;Dull Activity Tolerance:  
HR ranged low to mid 100s Please refer to the flowsheet for vital signs taken during this treatment. After treatment:  
[x]  Patient left in no apparent distress sitting up in chair 
[]  Patient left in no apparent distress in bed 
[x]  Call bell left within reach [x]  Nursing notified 
[]  Caregiver present 
[]  Bed alarm activated COMMUNICATION/COLLABORATION:  
The patients plan of care was discussed with: Registered Nurse Ramu León, PT, DPT Time Calculation: 23 mins

## 2018-09-12 NOTE — PROGRESS NOTES
Orthopedic Spine Progress Note Candace Duncan, AGACNP-BC Work Cell: [phone] Post Op Day: 6 Day Post-Op September 12, 2018 12:53 PM  
 
Yaw Guerra HPI:  
  
Yaw Guerra is a 46 y.o. female with progressive back and right leg pain. Pain onset approximately one year ago. She has pain running down the back of her leg and into her foot. She has accompanying numbness as well. Her MRI revealed a facet cyst at L5-S1 to the right with severe facet arthropathy. After a discussion of the risks, benefits, and alternatives, Yaw Guerra consented to undergo a  Procedure(s): 
L5-S1 RIGHT FACET CYST REMOVAL,  L5- S1 FUSION WITH MAZOR (REQ MAZOR X) Subjective  
  
Patient sitting up in bed.  at bedside. Patient reports RLE spasms have increased in frequency. I discussed adding scheduled muscle relaxant and increasing her gabapentin. + BM this am. She has lost momentum from her progress over the weekend. I encouraged her to increase activity today and PT to see twice daily. Patient denies headache, dizziness, cp, cough, dyspnea, abdominal pain, f/c, or n/v. 
 
  
 
 
 
Objective:  
 
Physical Exam: 
General:  Alert and oriented. No acute distress. Respiratory:  Breathing unlabored. Abdomen:  
Extremities: Obese, Soft, non-tender, non-distended, hypoactive bowel sounds No evidence of cyanosis. No edema in extremities Neurologic: 
 
Musculoskeletal:  No new motor deficits. Neurovascular exam within normal limits. Sensation stable. Motor: unchanged  L2-S1. Calves soft, nontender upon palpation and with passive stretch  Moves both upper and lower extremities. Incision: clean, dry, and intact. No significant erythema or swelling. No active drainage noted. Vital Signs:   
Patient Vitals for the past 8 hrs: 
 BP Temp Pulse Resp SpO2 Weight  
09/12/18 0700 145/82 98.8 °F (37.1 °C) 100 14 100 % -  
09/12/18 0300 142/78 98.3 °F (36.8 °C) 90 22 97 % (!) 162.4 kg (358 lb) Temp (24hrs), Av.8 °F (37.1 °C), Min:98.3 °F (36.8 °C), Max:99.2 °F (37.3 °C) Intake/Output: 
  
  
 
Pain Control:  
Pain Assessment Pain Scale 1: Numeric (0 - 10) Pain Intensity 1: 5 Pain Onset 1: post op Pain Location 1: Back, Leg 
Pain Orientation 1: Medial, Lower, Right Pain Description 1: Constant, Dull Pain Intervention(s) 1: Rest 
 
LAB:   
Recent Labs  
   09/10/18 
 0345 HCT  26.7* HGB  8.1* Lab Results Component Value Date/Time Sodium 141 2018 03:00 PM  
 Potassium 3.6 2018 03:00 PM  
 Chloride 104 2018 03:00 PM  
 CO2 28 2018 03:00 PM  
 Glucose 90 2018 03:00 PM  
 BUN 6 2018 03:00 PM  
 Creatinine 0.69 2018 03:00 PM  
 Calcium 8.9 2018 03:00 PM  
 
 
PT/OT:  
Gait:  Gait Base of Support: Widened Speed/Nallely: Shuffled, Slow Step Length: Left shortened, Right shortened Gait Abnormalities: Antalgic, Decreased step clearance, Shuffling gait, Trunk sway increased Ambulation - Level of Assistance: Contact guard assistance Distance (ft): 100 Feet (ft) (x2 reps) Assistive Device: Gait belt, Brace/Splint, Walker, rolling Rail Use: Right  (both hands on right ) Stairs - Level of Assistance: Assist X1, Moderate assistance Number of Stairs Trained: 2 Assessment/Plan  
  
1. 6 Days Post-Op Procedure(s): 
L5-S1 RIGHT FACET CYST REMOVAL,  L5- S1 FUSION WITH MAZOR (REQ MAZOR X) 
 -Continue PT/OT. Brace when oob 
 -Pain control- PRN oxycodone, scheduled APAP, ice to back 
 -added 750 mg Robaxin TID. Increased gabapentin to 900 mg BID 
 -voiding appropriately 
 -Encouraged incentive Spirometer 
 -Tolerating diet. BM this am 
 -VTE Prophylaxes - TEDS & SCDs 2. SIRS with fever, tachycardia, and leukocytosis -> resolved 
 -afebrile for 24 hours 
 -urinalysis negative 
 -CTA  negative 
 -blood cultures  ngtd 
 -on empiric Zosyn, will discuss stopping with hospitalist 
 
3. SVT -> resolved -now in [place] Z[place] with HR in 90's 
 -appreciate cardiology consult 
 -echo stable with normal EF, mild MR 
 -continue on metoprolol 25 mg BID per Cardiology. F/u in 2 weeks with Dr. Jennifer Rodrigues 4. Hx of Chronic hypertension 
 -/82 this am 
 -home losartan-hctz were stopped due to hypotension. Can likely resume. 5. Morbid obesity 
 -Body mass index is 61.45 kg/(m^2). -Encourage weight loss with diet and exercise program once cleared by orthopedic surgeon 6. Acute postoperative blood loss anemia -hgb 8.1 
 -anticipated blood loss anemia perioperatively. Continue to monitor 
 -start iron supplementation 7. Oropharyngeal candidiasis 
 -likely due to antibiotics 
 -nystatin swish Plan above discussed with Dr. Michael Pacheco, Dr. Ed James, , and cardiology Discharge To:  Rehab vs home. Insurance authorization is pending. Would like patient to increase activity today and be OOB with all meals. Hoping she can d/c home later today after she clears PT.  plans to rent hospital bed.    
 
Signed By: Estelle Zhang NP

## 2018-09-12 NOTE — PROGRESS NOTES
Bedside and Verbal shift change report given to Jordy Ziegler RN (oncoming nurse) by Dahlia Pride RN (offgoing nurse). Report included the following information SBAR, Kardex, MAR and Cardiac Rhythm Tachy to NSR.

## 2018-09-12 NOTE — PROGRESS NOTES
Problem: Self Care Deficits Care Plan (Adult) Goal: *Acute Goals and Plan of Care (Insert Text) Occupational Therapy Goals Initiated 9/7/2018 1. Patient will perform lower body dressing with supervision/set-up using AE PRN within 7 days. 2.  Patient will perform toilet transfer with modified independence using most appropriate DME within 7 days. 3.  Patient will grooming at the sink with modified independence within 7 days. 4.  Patient will don/doff back brace at modified independence within 7 days. 5.  Patient will verbalize/demonstrate 3/3 back precautions during ADL tasks without cues within 7 days. Occupational Therapy TREATMENT Patient: Bina Beatty (46 y.o. female) Date: 9/12/2018 Diagnosis: LUMBAR STENOSIS, LUMBAR DISC HERNIATION Spinal stenosis of lumbar region <principal problem not specified> Procedure(s) (LRB): 
L5-S1 RIGHT FACET CYST REMOVAL,  L5- S1 FUSION WITH MAZOR (REQ MAZOR X) (N/A) 6 Days Post-Op Precautions: Back Chart, occupational therapy assessment, plan of care, and goals were reviewed. ASSESSMENT: 
Patient cleared by RN to be seen for OT and received sitting upright in bed. Patient reporting she has been up in the chair and ambulating around room all morning. Patient able to recall 3/3 spinal precautions. Patient educated on energy conservation techniques, compensatory strategies, and use of AD during recovery for spinal sx. Patient provided with handouts on techniques discussed with patient verbalizing understanding and appreciation for information. Patient left in bed with call bell in reach and RN aware. Recommend with nursing patient to complete as able in order to maintain strength, endurance and independence: ADLs with supervision/setup, OOB to chair 3x/day and mobilizing to the bathroom for toileting with 1 assist. Thank you for your assistance. Progression toward goals: 
[]       Improving appropriately and progressing toward goals [x]       Improving slowly and progressing toward goals 
[]       Not making progress toward goals and plan of care will be adjusted PLAN: 
Patient continues to benefit from skilled intervention to address the above impairments. Continue treatment per established plan of care. Discharge Recommendations:  Rehab vs. [facility] Further Equipment Recommendations for Discharge:  Lower body dressing AD, shower chair SUBJECTIVE:  
Patient stated I can't wait to get home.  OBJECTIVE DATA SUMMARY:  
Cognitive/Behavioral Status: 
Neurologic State: Alert Orientation Level: Oriented X4 Cognition: Appropriate for age attention/concentration; Follows commands Perception: Appears intact Perseveration: No perseveration noted Safety/Judgement: Awareness of environment; Fall prevention Functional Mobility and Transfers for ADLs: 
Bed Mobility: 
NT - patient declined Transfers: NT - received in bed Balance: 
Sitting: Intact ADL Intervention: 
Patient instructed and indicated understanding energy conservation techniques to increase independence and safety during ADLs with good carryover, visual handout provided. Patient provided with handouts for compensatory strategies for dressing, bathing, grooming, toileting and ADLs while recovering from spinal sx with patient verbalizing good understanding. Feeding Feeding Assistance: Independent Grooming Washing Face: Independent (sitting in bed) Cognitive Retraining Safety/Judgement: Awareness of environment; Fall prevention Pain: 
Pain Scale 1: Numeric (0 - 10) Pain Intensity 1: 0 Pain Location 1: Back;Leg 
Pain Orientation 1: Medial;Lower;Right Pain Description 1: Constant;Dull Activity Tolerance:  
Fair, VSS Please refer to the flowsheet for vital signs taken during this treatment. After treatment:  
[] Patient left in no apparent distress sitting up in chair 
[x] Patient left in no apparent distress in bed 
[x] Call bell left within reach [x] Nursing notified 
[] Caregiver present 
[] Bed alarm activated COMMUNICATION/COLLABORATION:  
The patients plan of care was discussed with: Physical Therapist and Registered Nurse Rashad Sarah OT Time Calculation: 25 mins

## 2018-09-13 NOTE — DISCHARGE SUMMARY
[address] [place] [address]  [phone]     Discharge Summary       PATIENT ID: Ioana Dean  MRN: [id number]   YOB: 1970    DATE OF ADMISSION: 9/6/2018  5:51 AM    DATE OF DISCHARGE: 9/12/2018   PRIMARY CARE PROVIDER: Guadalupe Patel MD     CONSULTATIONS: IP CONSULT TO CARDIOLOGY    PROCEDURES/SURGERIES: Procedure(s):  L5-S1 RIGHT FACET CYST REMOVAL,  L5- S1 FUSION WITH MAZOR (REQ MAZOR X)    History of Present Illness:  Ioana Dean is a 46 y.o. female with progressive back and right leg pain. Pain onset approximately one year ago. She has pain running down the back of her leg and into her foot. She has accompanying numbness as well. Her MRI revealed a facet cyst at L5-S1 to the right with severe facet arthropathy. After failing conservative therapy and a discussion of the risks, benefits, alternatives, perioperative course, and potential complications of surgery, she consented to undergo a Procedure(s):  L5-S1 RIGHT FACET CYST REMOVAL,  L5- S1 FUSION WITH MAZOR (REQ MAZOR X). Hospital Course:  Ioana Dean tolerated the procedure well. She was transferred  to the recovery room in stable condition. After a brief stay the patient was then transferred to the Spinal Surgery Unit at [address] [place] [address].  On postoperative day #1, the dressing was clean and dry, she was neurovascularly intact. The patient was afebrile and vital signs were stable. Calves were soft and non-tender bilaterally. The patient was tolerating a regular diet and making slow progress with physical therapy. On postoperative day #2 she developed fevers, hypotension, and new onset SVT with -180. She was transferred to the Neuroscience Telemetry floor for monitoring and the cardiology and hospitalist service were consulted. She converted to sinus rhythm with metoprolol.  She was started on broad antibiotics for coverage of possible sepsis. By Tuesday after her surgery she began to improve clinically. Antibiotics were stopped and she continued to make progress with therapies. She had a BM on postoperative day #6. Hemoglobin prior to discharge were   Lab Results   Component Value Date/Time    HGB 8.1 (L) 09/10/2018 03:45 AM        Dillon Lopez made satisfactory progress with physical therapy and was discharged to Home with home health in stable condition on postoperative day 6. She was provided with routine postoperative instructions and advised to follow up with Jon Castañeda MD in 2 weeks following discharge from the hospital.  She will continue in her TLSO brace for 4-6 weeks. She was instructed to follow-up with Dr. Coletta Krabbe, Cardiology, in 2 weeks regarding SVT following surgery. FOLLOW UP APPOINTMENTS:    Follow-up Information     Follow up With Details Comments Contact Info     In 1 day Home Health PT/OT. Please call P#[phone] if you do not hear from [facility] by 11AM the day after discharge. [facility]   Address: [address] [place] [address]  Phone: [phone]    Ángel Lezama MD   [address]      Carol Bennett MD Schedule an appointment as soon as possible for a visit in 2 weeks follow-up for SVT after surgery [place] [address]  [address] [facility] [phone]             DISCHARGE MEDICATIONS:  Discharge Medication List as of 9/12/2018  6:23 PM      START taking these medications    Details   ferrous sulfate (IRON, FERROUS SULFATE,) 325 mg (65 mg iron) tablet Take 1 Tab by mouth Daily (before breakfast). , Print, Disp-30 Tab, R-1      methocarbamol (ROBAXIN) 750 mg tablet Take 1 Tab by mouth three (3) times daily. , Print, Disp-30 Tab, R-0      metoprolol tartrate (LOPRESSOR) 25 mg tablet Take 1 Tab by mouth every twelve (12) hours. , Print, Disp-60 Tab, R-0      senna-docusate (PERICOLACE) 8.6-50 mg per tablet Take 1 Tab by mouth two (2) times a day. Indications: constipation, hold for loose stool or diarrhea, Print, Disp-30 Tab, R-0      oxyCODONE IR (ROXICODONE) 5 mg immediate release tablet Take 1-2 Tabs by mouth every four (4) hours as needed. Max Daily Amount: 60 mg., Print, Disp-60 Tab, R-0         CONTINUE these medications which have NOT CHANGED    Details   aspirin 81 mg chewable tablet Take 81 mg by mouth daily. , Historical Med      telmisartan-hydroCHLOROthiazide (MICARDIS HCT) 80-12.5 mg per tablet Take 1 Tab by mouth daily. , Historical Med      ascorbic acid, vitamin C, (VITAMIN C) 500 mg tablet Take  by mouth daily. , Historical Med      cholecalciferol (VITAMIN D3) 400 unit tab tablet Take 800 Units by mouth daily. , Historical Med      gabapentin (NEURONTIN) 300 mg capsule Take 600 mg by mouth two (2) times a day., Historical Med         STOP taking these medications       meloxicam (MOBIC) 15 mg tablet Comments:   Reason for Stopping:               PHYSICAL EXAMINATION AT DISCHARGE:  General: Pleasant, alert, cooperative, no distress. Resp: Equal chest expansion. No accessory muscle use. CV:  No cyanosis or clubbing. No edema appreciated in the extremities. Gastrointestinal:  Soft, non-tender, non-distended. Neurological: Follows commands. MIRELES. Speech clear. Sensation intact to light touch. Motor: unchanged L2-S1. Musculoskeletal:  Calves soft, non-tender upon palpation or with passive stretch. Skin: No obvious rashes or lesions. Incision - clean, dry and intact. No significant erythema or swelling. CHRONIC MEDICAL DIAGNOSES:  Problem List as of 9/12/2018  Date Reviewed: 9/6/2018          Codes Class Noted - Resolved    Morbid obesity ([facility] [address] (Chronic) ICD-10-CM: E66.01  ICD-9-CM: 278.01  Unknown - Present    Overview Signed 9/7/2018  8:14 AM by Goldy Arambula NP     Body mass index is 55.61 kg/(m^2).              Spinal stenosis of lumbar region ICD-10-CM: I08.987  ICD-9-CM: 724.02  9/6/2018 - Present              Signed:   Eulis Eisenmenger, NP  9/13/2018  2:42 PM

## 2018-09-14 LAB
BACTERIA SPEC CULT: NORMAL
SERVICE CMNT-IMP: NORMAL

## 2018-11-27 ENCOUNTER — HOSPITAL ENCOUNTER (OUTPATIENT)
Dept: MRI IMAGING | Age: 48
Discharge: HOME | End: 2018-11-27
Attending: ORTHOPAEDIC SURGERY

## 2018-11-27 DIAGNOSIS — M51.36 DDD (DEGENERATIVE DISC DISEASE), LUMBAR: ICD-10-CM

## 2018-11-27 DIAGNOSIS — M54.16 LUMBAR RADICULOPATHY: ICD-10-CM

## 2018-11-29 ENCOUNTER — HOSPITAL ENCOUNTER (OUTPATIENT)
Dept: MRI IMAGING | Age: 48
Discharge: HOME | End: 2018-11-29
Attending: ORTHOPAEDIC SURGERY
Payer: COMMERCIAL

## 2018-11-29 PROCEDURE — 72148 MRI LUMBAR SPINE W/O DYE: CPT

## 2019-04-03 ENCOUNTER — OP HISTORICAL/CONVERTED ENCOUNTER (OUTPATIENT)
Dept: OTHER | Age: 49
End: 2019-04-03

## 2019-04-03 LAB — COLONOSCOPY, EXTERNAL: NORMAL

## 2019-06-03 LAB
CREATININE, EXTERNAL: 0.73
HBA1C MFR BLD HPLC: 5.8 %

## 2020-07-24 DIAGNOSIS — M79.10 MYALGIA: Primary | ICD-10-CM

## 2020-09-29 VITALS
RESPIRATION RATE: 18 BRPM | WEIGHT: 293 LBS | OXYGEN SATURATION: 98 % | DIASTOLIC BLOOD PRESSURE: 72 MMHG | HEART RATE: 72 BPM | HEIGHT: 64 IN | TEMPERATURE: 98.2 F | BODY MASS INDEX: 50.02 KG/M2 | SYSTOLIC BLOOD PRESSURE: 136 MMHG

## 2020-09-29 PROBLEM — E55.9 VITAMIN D DEFICIENCY: Status: ACTIVE | Noted: 2020-09-29

## 2020-09-29 PROBLEM — J30.2 SEASONAL ALLERGIC RHINITIS: Status: ACTIVE | Noted: 2020-09-29

## 2020-09-29 PROBLEM — M79.7 FIBROMYALGIA: Status: ACTIVE | Noted: 2020-09-29

## 2020-09-29 PROBLEM — M79.18 MUSCULOSKELETAL PAIN: Status: ACTIVE | Noted: 2020-09-29

## 2020-09-29 PROBLEM — I10 ESSENTIAL HYPERTENSION: Status: ACTIVE | Noted: 2020-09-29

## 2020-09-29 PROBLEM — F32.9 REACTIVE DEPRESSION (SITUATIONAL): Status: ACTIVE | Noted: 2020-09-29

## 2020-10-07 ENCOUNTER — TELEPHONE (OUTPATIENT)
Dept: INTERNAL MEDICINE CLINIC | Age: 50
End: 2020-10-07

## 2020-10-07 PROBLEM — R20.2 PARESTHESIA: Status: ACTIVE | Noted: 2020-10-07

## 2020-10-07 NOTE — TELEPHONE ENCOUNTER
Patient called and would like to come in and see Dr. Charlotte Felipe stating she needs her blood pressure checked,  Nerves (feels like she is having a breakdown). Only available appointments are for end of month. She would like to see if Dr. Charlotte Felipe can work her in before the end of month. Please call her.

## 2020-10-09 ENCOUNTER — TELEPHONE (OUTPATIENT)
Dept: INTERNAL MEDICINE CLINIC | Age: 50
End: 2020-10-09

## 2020-11-04 RX ORDER — GUAIFENESIN 100 MG/5ML
LIQUID (ML) ORAL
Qty: 30 TAB | Refills: 0 | Status: SHIPPED | OUTPATIENT
Start: 2020-11-04

## 2020-11-04 RX ORDER — DULOXETIN HYDROCHLORIDE 30 MG/1
CAPSULE, DELAYED RELEASE ORAL
Qty: 30 CAP | Refills: 0 | Status: SHIPPED | OUTPATIENT
Start: 2020-11-04 | End: 2020-12-26

## 2020-11-13 NOTE — TELEPHONE ENCOUNTER
Called patient to schedule appointment. Voicemail was in [address] and did not beep for me to leave a message.

## 2020-12-26 RX ORDER — DULOXETIN HYDROCHLORIDE 30 MG/1
CAPSULE, DELAYED RELEASE ORAL
Qty: 30 CAP | Refills: 0 | Status: SHIPPED | OUTPATIENT
Start: 2020-12-26 | End: 2021-02-14

## 2021-01-06 NOTE — TELEPHONE ENCOUNTER
Called patient to schedule appointment. Patient's phone number no longer in service.   Called and left message on 's cell phone to have patient call the office

## 2021-01-08 NOTE — TELEPHONE ENCOUNTER
Called patient to schedule appointment. Patient's phone number no longer in service.   Called and left message on 's cell phone to have patient to have patient call the office to schedule an appointment

## 2021-03-23 DIAGNOSIS — I10 ESSENTIAL (PRIMARY) HYPERTENSION: ICD-10-CM

## 2021-03-23 DIAGNOSIS — M79.7 FIBROMYALGIA: ICD-10-CM

## 2021-03-23 RX ORDER — TELMISARTAN AND HYDROCHLORTHIAZIDE 80; 12.5 MG/1; MG/1
TABLET ORAL
Qty: 30 TAB | Refills: 0 | OUTPATIENT
Start: 2021-03-23

## 2021-03-29 RX ORDER — DULOXETIN HYDROCHLORIDE 60 MG/1
CAPSULE, DELAYED RELEASE ORAL
Qty: 90 CAP | Refills: 1 | OUTPATIENT
Start: 2021-03-29

## 2021-05-24 DIAGNOSIS — M79.7 FIBROMYALGIA: ICD-10-CM

## 2021-05-24 RX ORDER — DULOXETIN HYDROCHLORIDE 60 MG/1
CAPSULE, DELAYED RELEASE ORAL
Qty: 90 CAPSULE | Refills: 1 | OUTPATIENT
Start: 2021-05-24

## 2021-06-07 RX ORDER — DULOXETIN HYDROCHLORIDE 30 MG/1
CAPSULE, DELAYED RELEASE ORAL
Qty: 90 CAPSULE | Refills: 0 | OUTPATIENT
Start: 2021-06-07

## 2021-07-09 DIAGNOSIS — I10 ESSENTIAL (PRIMARY) HYPERTENSION: ICD-10-CM

## 2021-07-09 DIAGNOSIS — M79.7 FIBROMYALGIA: ICD-10-CM

## 2021-07-09 RX ORDER — DULOXETIN HYDROCHLORIDE 60 MG/1
CAPSULE, DELAYED RELEASE ORAL
Qty: 90 CAPSULE | Refills: 1 | OUTPATIENT
Start: 2021-07-09

## 2021-07-09 RX ORDER — TELMISARTAN AND HYDROCHLORTHIAZIDE 80; 12.5 MG/1; MG/1
TABLET ORAL
Qty: 30 TABLET | Refills: 0 | OUTPATIENT
Start: 2021-07-09

## 2021-07-12 ENCOUNTER — TELEPHONE (OUTPATIENT)
Dept: INTERNAL MEDICINE CLINIC | Age: 51
End: 2021-07-12

## 2021-07-12 DIAGNOSIS — I10 ESSENTIAL (PRIMARY) HYPERTENSION: ICD-10-CM

## 2021-07-12 RX ORDER — TELMISARTAN AND HYDROCHLORTHIAZIDE 80; 12.5 MG/1; MG/1
TABLET ORAL
Qty: 90 TABLET | Refills: 0 | OUTPATIENT
Start: 2021-07-12

## 2022-03-18 PROBLEM — M79.18 MUSCULOSKELETAL PAIN: Status: ACTIVE | Noted: 2020-09-29

## 2022-03-18 PROBLEM — E55.9 VITAMIN D DEFICIENCY: Status: ACTIVE | Noted: 2020-09-29

## 2022-03-19 PROBLEM — F32.9 REACTIVE DEPRESSION (SITUATIONAL): Status: ACTIVE | Noted: 2020-09-29

## 2022-03-19 PROBLEM — M48.061 SPINAL STENOSIS OF LUMBAR REGION: Status: ACTIVE | Noted: 2018-09-06

## 2022-03-19 PROBLEM — J30.2 SEASONAL ALLERGIC RHINITIS: Status: ACTIVE | Noted: 2020-09-29

## 2022-03-19 PROBLEM — R20.2 PARESTHESIA: Status: ACTIVE | Noted: 2020-10-07

## 2022-03-19 PROBLEM — M79.7 FIBROMYALGIA: Status: ACTIVE | Noted: 2020-09-29

## 2022-03-20 PROBLEM — I10 ESSENTIAL HYPERTENSION: Status: ACTIVE | Noted: 2020-09-29

## 2023-10-20 NOTE — PROGRESS NOTES
Problem: Falls - Risk of 
Goal: *Absence of Falls Document Herbie Alcantar Fall Risk and appropriate interventions in the flowsheet. Outcome: Progressing Towards Goal 
Fall Risk Interventions: 
Mobility Interventions: Assess mobility with egress test, Communicate number of staff needed for ambulation/transfer Mentation Interventions: Adequate sleep, hydration, pain control, Door open when patient unattended Medication Interventions: Patient to call before getting OOB Elimination Interventions: Call light in reach Problem: Pain Goal: *Control of Pain Outcome: Progressing Towards Goal 
Numeric scale to used to assess. PO meds to manage. Problem: Pressure Injury - Risk of 
Goal: *Prevention of pressure injury Document Glynn Scale and appropriate interventions in the flowsheet. Outcome: Progressing Towards Goal 
Pressure Injury Interventions: 
Sensory Interventions: Assess changes in LOC, Assess need for specialty bed, Keep linens dry and wrinkle-free, Minimize linen layers Activity Interventions: Increase time out of bed Mobility Interventions: Pressure redistribution bed/mattress (bed type) Nutrition Interventions: Document food/fluid/supplement intake Friction and Shear Interventions: Lift sheet, Minimize layers Problem: Arrhythmia Pathway (Adult) Goal: *Absence of arrhythmia Outcome: Progressing Towards Goal 
Patient remains in a sinus rhythm at this time. medium/normal

## 2024-04-02 ENCOUNTER — HOSPITAL ENCOUNTER (OUTPATIENT)
Age: 54
Discharge: HOME | End: 2024-04-05
Payer: MEDICARE

## 2024-04-02 DIAGNOSIS — M13.862 ARTHRITIS, CLIMACTERIC, KNEE, LEFT: ICD-10-CM

## 2024-04-02 PROCEDURE — 73562 X-RAY EXAM OF KNEE 3: CPT

## (undated) DEVICE — NEEDLE HYPO 22GA L1.5IN BLK POLYPR HUB S STL REG BVL STR

## (undated) DEVICE — PREP SKN PREVAIL 40ML APPL --

## (undated) DEVICE — KENDALL SCD EXPRESS SLEEVES, KNEE LENGTH, MEDIUM: Brand: KENDALL SCD

## (undated) DEVICE — GAUZE SPONGES,12 PLY: Brand: CURITY

## (undated) DEVICE — BONE WAX WHITE: Brand: BONE WAX WHITE

## (undated) DEVICE — PREP KIT PEEL PTCH POVIDONE IOD

## (undated) DEVICE — LAMINECTOMY RICHMOND-LF: Brand: MEDLINE INDUSTRIES, INC.

## (undated) DEVICE — FLOSEAL HEMOSTATIC MATRIX, 10 ML: Brand: FLOSEAL

## (undated) DEVICE — X-RAY SPONGES,16 PLY: Brand: DERMACEA

## (undated) DEVICE — STERILE POLYISOPRENE POWDER-FREE SURGICAL GLOVES WITH EMOLLIENT COATING: Brand: PROTEXIS

## (undated) DEVICE — SUTURE VCRL SZ 3-0 L27IN ABSRB UD L24MM PS-1 3/8 CIR PRIM J936H

## (undated) DEVICE — 4-PORT MANIFOLD: Brand: NEPTUNE 2

## (undated) DEVICE — DRAPE C-ARMOUR C-ARM KIT --

## (undated) DEVICE — BONE MARROW KIT ASPIR 11 GA

## (undated) DEVICE — NDL SPNE QNCKE 18GX3.5IN LF --

## (undated) DEVICE — COVER,TABLE,HEAVY DUTY,60"X90",STRL: Brand: MEDLINE

## (undated) DEVICE — K-WIRE

## (undated) DEVICE — KIT ROBOTIC SPINE DISP MAZORX

## (undated) DEVICE — 1010 S-DRAPE TOWEL DRAPE 10/BX: Brand: STERI-DRAPE™

## (undated) DEVICE — APPLICATOR BNDG 1MM ADH PREMIERPRO EXOFIN

## (undated) DEVICE — BLADE ELECTRODE: Brand: EDGE

## (undated) DEVICE — INFECTION CONTROL KIT SYS

## (undated) DEVICE — SUTURE VCRL 2-0 L27IN ABSRB UD CP-2 L26MM 1/2 CIR REV CUT J869H

## (undated) DEVICE — PILLOW POS AD L7IN R FOAM HD REST INTUB SLOT DISP

## (undated) DEVICE — DRAIN KT WND 10FR RND 400ML --

## (undated) DEVICE — MEDI-VAC NON-CONDUCTIVE SUCTION TUBING: Brand: CARDINAL HEALTH

## (undated) DEVICE — SUTURE VCRL 0 L27IN ABSRB UD CT L40MM 1/2 CIR TAPERPOINT J280H

## (undated) DEVICE — TOOL 14MH30 LEGEND 14CM 3MM: Brand: MIDAS REX ™

## (undated) DEVICE — BIPOLAR FORCEPS CORD: Brand: VALLEYLAB

## (undated) DEVICE — SUTURE VCRL 1 L27IN ABSRB VLT TP-1 L65MM 1/2 CIR TAPERPOINT J650G

## (undated) DEVICE — SOLUTION IV 1000ML 0.9% SOD CHL

## (undated) DEVICE — TRAY CATH W/ 16FR CATH URIN M CTRL FIT OUTLT TB F STATLOK